# Patient Record
Sex: FEMALE | Race: WHITE | NOT HISPANIC OR LATINO | URBAN - METROPOLITAN AREA
[De-identification: names, ages, dates, MRNs, and addresses within clinical notes are randomized per-mention and may not be internally consistent; named-entity substitution may affect disease eponyms.]

---

## 2017-11-25 ENCOUNTER — EMERGENCY (EMERGENCY)
Facility: HOSPITAL | Age: 72
LOS: 1 days | Discharge: HOME | End: 2017-11-25
Admitting: EMERGENCY MEDICINE
Payer: MEDICARE

## 2017-11-25 VITALS
DIASTOLIC BLOOD PRESSURE: 77 MMHG | OXYGEN SATURATION: 97 % | RESPIRATION RATE: 18 BRPM | HEART RATE: 77 BPM | TEMPERATURE: 98 F | SYSTOLIC BLOOD PRESSURE: 144 MMHG

## 2017-11-25 DIAGNOSIS — S60.561A INSECT BITE (NONVENOMOUS) OF RIGHT HAND, INITIAL ENCOUNTER: ICD-10-CM

## 2017-11-25 DIAGNOSIS — Z79.899 OTHER LONG TERM (CURRENT) DRUG THERAPY: ICD-10-CM
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
## 2017-11-25 DIAGNOSIS — S80.861A INSECT BITE (NONVENOMOUS), RIGHT LOWER LEG, INITIAL ENCOUNTER: ICD-10-CM

## 2017-11-25 DIAGNOSIS — S00.86XA INSECT BITE (NONVENOMOUS) OF OTHER PART OF HEAD, INITIAL ENCOUNTER: ICD-10-CM

## 2017-11-25 DIAGNOSIS — I10 ESSENTIAL (PRIMARY) HYPERTENSION: ICD-10-CM

## 2017-11-25 DIAGNOSIS — S80.862A INSECT BITE (NONVENOMOUS), LEFT LOWER LEG, INITIAL ENCOUNTER: ICD-10-CM
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
## 2017-11-25 DIAGNOSIS — E78.5 HYPERLIPIDEMIA, UNSPECIFIED: ICD-10-CM

## 2017-11-25 DIAGNOSIS — S60.562A INSECT BITE (NONVENOMOUS) OF LEFT HAND, INITIAL ENCOUNTER: ICD-10-CM
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
## 2017-11-25 PROCEDURE — 99283 EMERGENCY DEPT VISIT LOW MDM: CPT

## 2017-11-25 RX ORDER — HYDROXYZINE HCL 10 MG
50 TABLET ORAL ONCE
Qty: 0 | Refills: 0 | Status: COMPLETED | OUTPATIENT
Start: 2017-11-25 | End: 2017-11-25

## 2017-11-25 RX ORDER — HYDROCORTISONE 1 %
1 OINTMENT (GRAM) TOPICAL ONCE
Qty: 0 | Refills: 0 | Status: COMPLETED | OUTPATIENT
Start: 2017-11-25 | End: 2017-11-25

## 2017-11-25 RX ADMIN — Medication 50 MILLIGRAM(S): at 16:17

## 2017-11-25 RX ADMIN — Medication 50 MILLIGRAM(S): at 15:48

## 2017-11-25 RX ADMIN — Medication 1 APPLICATION(S): at 15:48

## 2017-11-25 NOTE — ED PROVIDER NOTE - MEDICAL DECISION MAKING DETAILS
Pt with likely bed bug bites diffusely. Will give Atarax and hydrocortisone cream, and dose of 50mg prednisone. Pt will follow up with Derm on Monday.

## 2017-11-25 NOTE — ED PROVIDER NOTE - PHYSICAL EXAMINATION
VITAL SIGNS: I have reviewed nursing notes and confirm.  CONSTITUTIONAL: Well-developed; well-nourished; in no acute distress.  SKIN: Large bites with surrounding erythema and edema to all 4 extremities and front of trunk and left side of cheek and jaw. No insects visible on skin.  HEAD: Normocephalic; atraumatic.  EYES: PERRL, EOM intact; conjunctiva and sclera clear.  ENT: No nasal discharge; airway clear.  NECK: Supple; non tender.  CARD: S1, S2 normal; no murmurs, gallops, or rubs. Regular rate and rhythm.  RESP: No wheezes, rales or rhonchi.  ABD: Normal bowel sounds; soft; non-distended; non-tender; no hepatosplenomegaly.  EXT: Normal ROM. No clubbing, cyanosis or edema.  NEURO: Alert, oriented. Grossly unremarkable.  PSYCH: Cooperative, appropriate.

## 2017-11-25 NOTE — ED PROVIDER NOTE - OBJECTIVE STATEMENT
73 yo F with Hx of HTN presents to ER c/o bug bites to entire body. Pt states stayed at hotel on Thursday and Friday and woke up this morning with bug bites, which are believed to be bed bugs and is concerned due to size of bites. Denies Hx of bites before.

## 2017-11-25 NOTE — ED ADULT TRIAGE NOTE - CHIEF COMPLAINT QUOTE
Pt states from staying in a hotel last night she was bit by bed bugs and now they are itchy. Redness noted to neck. No distress noted.

## 2022-10-15 ENCOUNTER — EMERGENCY (EMERGENCY)
Facility: HOSPITAL | Age: 77
LOS: 1 days | Discharge: HOME | End: 2022-10-15
Attending: EMERGENCY MEDICINE | Admitting: EMERGENCY MEDICINE

## 2022-10-15 VITALS
SYSTOLIC BLOOD PRESSURE: 131 MMHG | HEIGHT: 63 IN | DIASTOLIC BLOOD PRESSURE: 90 MMHG | RESPIRATION RATE: 16 BRPM | HEART RATE: 96 BPM | WEIGHT: 175.05 LBS | TEMPERATURE: 98 F | OXYGEN SATURATION: 99 %

## 2022-10-15 PROCEDURE — 99283 EMERGENCY DEPT VISIT LOW MDM: CPT

## 2022-10-15 RX ORDER — FUROSEMIDE 40 MG
0 TABLET ORAL
Qty: 0 | Refills: 0 | DISCHARGE

## 2022-10-15 RX ADMIN — Medication 1 TABLET(S): at 19:11

## 2022-10-15 NOTE — ED PROVIDER NOTE - NSFOLLOWUPINSTRUCTIONS_ED_ALL_ED_FT
Nonallergic Rhinitis      Nonallergic rhinitis is inflammation of the mucous membrane inside the nose. The mucous membrane is the tissue that produces mucus. This condition is different from having allergic rhinitis, which is an allergy that affects the nose. Allergic rhinitis occurs when the body's defense system, or immune system, reacts to a substance that a person is allergic to (allergen), such as pollen, pet dander, mold, or dust. Nonallergic rhinitis has many similar symptoms, but it is not caused by allergens.    Nonallergic rhinitis can be an acute or chronic problem. This means it can be short-term or long-term.      What are the causes?    This condition may be caused by many different things. Some common types of nonallergic rhinitis include:  •Infectious rhinitis. This is usually caused by an infection in the nose, throat, or upper airways (upper respiratory system).      •Vasomotor rhinitis. This is the most common type of chronic nonallergic rhinitis. It is caused by too much blood flow through your nose, and it leads to swelling in your nose. It is triggered by strong odors, cold air, stress, drinking alcohol, cigarette smoke, or changes in the weather.      •Occupational rhinitis. This type is caused by triggers in the workplace, such as chemicals, dust, animal dander, or air pollution.      •Hormonal rhinitis, in teenage girls and women. This type is caused by an increase in the hormone estrogen and may happen during pregnancy, puberty, or monthly menstrual periods. Hormonal rhinitis gives you fewer symptoms when estrogen levels drop.      •Drug-induced rhinitis. Several types of medicines can cause this, such as medicines for high blood pressure or heart disease, aspirin, or NSAIDs.      •Nonallergic rhinitis with eosinophilia syndrome (NARES). This type is caused by having too much eosinophil, a type of white blood cell.      Other causes include a reaction to eating hot or spicy foods. This does not usually cause long-term symptoms.    In some cases, the cause of nonallergic rhinitis is not known.      What increases the risk?    You are more likely to develop this condition if:  •You are 30–60 years of age.      •You are a woman. Women are twice as likely to have this condition.        What are the signs or symptoms?    Common symptoms of this condition include:  •Stuffy nose (nasal congestion).      •Runny nose.      •A feeling of mucus dripping down the back of your throat (postnasal drip).      •Trouble sleeping.      •Tiredness, or fatigue.      Other symptoms include:  •Sneezing.      •Coughing.      •Itchy nose.      •Bloodshot eyes.        How is this diagnosed?    This type may be diagnosed based on:  •Your symptoms and medical history.      •A physical exam.      •Allergy testing to rule out allergic rhinitis. You may have skin tests or blood tests.      Your health care provider may also take a swab of nasal discharge to look for an increased number of eosinophils. This would be done to confirm a diagnosis of NARES.      How is this treated?     Treatment for this condition depends on the cause. No single treatment works for everyone. Work with your health care provider to find the best treatment for you. Treatment may include:  •Avoiding the things that trigger your symptoms.    •Medicines to relieve congestion, such as:  •Steroid nasal spray. There are many types. You may need to try a few to find out which one works best.      •Decongestant medicine. This treats nasal congestion and may be given by mouth or as a nasal spray. These medicines are used only for a short time.        •Medicines to relieve a runny nose. These may include antihistamine medicines or anticholinergic nasal sprays.      •Nasal irrigation. This involves using a salt–water (saline) spray or saline container called a neti pot. Nasal irrigation helps to clear away mucus and keep your nasal passages moist.      •Surgery to remove part of your mucous membrane. This is done in severe cases if the condition has not improved after 6–12 months of treatment.        Follow these instructions at home:    Medicines     •Take or use over-the-counter and prescription medicines only as told by your health care provider. Do not stop using your medicine even if you start to feel better.      • Do not take NSAIDs, such as ibuprofen, or medicines that contain aspirin if they make your symptoms worse.      Lifestyle     • Do not drink alcohol if it makes your symptoms worse.      • Do not use any products that contain nicotine or tobacco, such as cigarettes, e-cigarettes, and chewing tobacco. If you need help quitting, ask your health care provider.      •Avoid secondhand smoke.      General instructions     •Avoid triggers that make your symptoms worse.      •Use nasal irrigation as told by your health care provider.      •Get exercise. Exercise may help reduce symptoms for some people.      •Sleep with the head of your bed raised. This may reduce nasal congestion when you sleep.      •Drink enough fluid to keep your urine pale yellow.      •Keep all follow-up visits as told by your health care provider. This is important.        Contact a health care provider if:    •You have a fever.      •Your symptoms are getting worse at home.      •Your symptoms do not lessen with medicine.      •You develop new symptoms, especially a headache or nosebleed.        Summary    •Nonallergic rhinitis is inflammation inside the nose that is not caused by allergens. Nonallergic rhinitis can be a short-term or long-term problem.      •Treatment may include avoiding the things that trigger your symptoms.      •Take or use over-the-counter and prescription medicines only as told by your health care provider. Do not stop using your medicine even if you start to feel better.      •Contact a health care provider if your symptoms do not lessen with medicine.      This information is not intended to replace advice given to you by your health care provider. Make sure you discuss any questions you have with your health care provider.      Document Revised: 10/26/2020 Document Reviewed: 10/26/2020    Elsevier Patient Education © 2022 [employer]

## 2022-10-15 NOTE — ED PROVIDER NOTE - CLINICAL SUMMARY MEDICAL DECISION MAKING FREE TEXT BOX
Patient with pain, erythema, and hyperemia of right nasal mucosa. will tx with antibiotics and refer to ENT. Return to the ED immediately if getting worse, not improving, or if having any new or troubling symptoms.

## 2022-10-15 NOTE — ED PROVIDER NOTE - PHYSICAL EXAMINATION
Gen: Well-developed, well-nourished, NAD, VS as noted by nursing. HEENT: NCAT, mmm, erythema of b/l nasal mucosa, markedly worse on right with hyperemia. Clear nasal d/c.  Chest: RRR, nl S1 and S2, no m/r/g. Resp: CTAB, no w/r/r  Abd: nl BS, soft, nt/nd. Ext: Warm, dry

## 2022-10-15 NOTE — ED PROVIDER NOTE - OBJECTIVE STATEMENT
Patient c/o 3 days of severe pain inside right naris. Hurts when she breaths through her nose. Has been controlling the pain by plugging naris with cotton covered in Aquafor. When nose is plugged, she has no pain. Has chronic rhinorrhea for 1 year. Uses a CPAP, but has not used it since pain began. No fever, cough, epistaxis, sore throat, cp, sob.

## 2022-10-15 NOTE — ED PROVIDER NOTE - NSFOLLOWUPCLINICS_GEN_ALL_ED_FT
[facility]  Otolaryngology (ENT)  [address] [place] [address]  Phone: [phone]  Fax: [phone]  Follow Up Time: 4-6 Days

## 2022-10-15 NOTE — ED PROVIDER NOTE - PATIENT PORTAL LINK FT
You can access the FollowMyHealth Patient Portal offered by [facility] by registering at the following website: [email or web address]. By joining [facility]’s FollowMyHealth portal, you will also be able to view your health information using other applications (apps) compatible with our system.

## 2022-10-19 DIAGNOSIS — J34.89 OTHER SPECIFIED DISORDERS OF NOSE AND NASAL SINUSES: ICD-10-CM

## 2022-10-19 DIAGNOSIS — E78.5 HYPERLIPIDEMIA, UNSPECIFIED: ICD-10-CM

## 2022-10-19 DIAGNOSIS — I10 ESSENTIAL (PRIMARY) HYPERTENSION: ICD-10-CM

## 2025-05-01 ENCOUNTER — INPATIENT (INPATIENT)
Facility: HOSPITAL | Age: 80
LOS: 4 days | Discharge: HOME HEALTH | End: 2025-05-06
Attending: STUDENT IN AN ORGANIZED HEALTH CARE EDUCATION/TRAINING PROGRAM | Admitting: INTERNAL MEDICINE
Payer: MEDICARE

## 2025-05-01 VITALS
RESPIRATION RATE: 15 BRPM | OXYGEN SATURATION: 98 % | HEIGHT: 66 IN | DIASTOLIC BLOOD PRESSURE: 67 MMHG | HEART RATE: 79 BPM | SYSTOLIC BLOOD PRESSURE: 107 MMHG | WEIGHT: 231.49 LBS | TEMPERATURE: 98 F

## 2025-05-01 LAB
ANION GAP SERPL CALC-SCNC: 10 MMOL/L — SIGNIFICANT CHANGE UP (ref 9–16)
BASOPHILS # BLD AUTO: 0.02 K/UL — SIGNIFICANT CHANGE UP (ref 0–0.2)
BASOPHILS NFR BLD AUTO: 0.3 % — SIGNIFICANT CHANGE UP (ref 0–2)
BUN SERPL-MCNC: 28 MG/DL — HIGH (ref 7–23)
CALCIUM SERPL-MCNC: 9.1 MG/DL — SIGNIFICANT CHANGE UP (ref 8.5–10.5)
CHLORIDE SERPL-SCNC: 90 MMOL/L — LOW (ref 96–108)
CO2 SERPL-SCNC: 30 MMOL/L — SIGNIFICANT CHANGE UP (ref 22–31)
CREAT SERPL-MCNC: 1.36 MG/DL — HIGH (ref 0.5–1.3)
EGFR: 39 ML/MIN/1.73M2 — LOW
EGFR: 39 ML/MIN/1.73M2 — LOW
EOSINOPHIL # BLD AUTO: 0.05 K/UL — SIGNIFICANT CHANGE UP (ref 0–0.5)
EOSINOPHIL NFR BLD AUTO: 0.6 % — SIGNIFICANT CHANGE UP (ref 0–6)
GLUCOSE SERPL-MCNC: 343 MG/DL — HIGH (ref 70–99)
HCT VFR BLD CALC: 38.2 % — SIGNIFICANT CHANGE UP (ref 34.5–45)
HGB BLD-MCNC: 13.3 G/DL — SIGNIFICANT CHANGE UP (ref 11.5–15.5)
IMM GRANULOCYTES # BLD AUTO: 0.33 K/UL — HIGH (ref 0–0.07)
IMM GRANULOCYTES NFR BLD AUTO: 4.1 % — HIGH (ref 0–0.9)
IMMATURE PLATELET FRACTION #: 5 K/UL — SIGNIFICANT CHANGE UP (ref 4.7–11.1)
IMMATURE PLATELET FRACTION %: 8.2 % — HIGH (ref 1.6–4.9)
LYMPHOCYTES # BLD AUTO: 1.6 K/UL — SIGNIFICANT CHANGE UP (ref 1–3.3)
LYMPHOCYTES NFR BLD AUTO: 20 % — SIGNIFICANT CHANGE UP (ref 13–44)
MCHC RBC-ENTMCNC: 34.8 G/DL — SIGNIFICANT CHANGE UP (ref 32–36)
MCHC RBC-ENTMCNC: 35.2 PG — HIGH (ref 27–34)
MCV RBC AUTO: 101.1 FL — HIGH (ref 80–100)
MONOCYTES # BLD AUTO: 0.42 K/UL — SIGNIFICANT CHANGE UP (ref 0–0.9)
MONOCYTES NFR BLD AUTO: 5.3 % — SIGNIFICANT CHANGE UP (ref 2–14)
NEUTROPHILS # BLD AUTO: 5.57 K/UL — SIGNIFICANT CHANGE UP (ref 1.8–7.4)
NEUTROPHILS NFR BLD AUTO: 69.7 % — SIGNIFICANT CHANGE UP (ref 43–77)
NRBC # BLD AUTO: 0.02 K/UL — HIGH (ref 0–0)
NRBC # FLD: 0.02 K/UL — HIGH (ref 0–0)
NRBC BLD AUTO-RTO: 0 /100 WBCS — SIGNIFICANT CHANGE UP (ref 0–0)
PCO2 BLDV: 44 MMHG — HIGH (ref 39–42)
PH BLDV: 7.49 — HIGH (ref 7.32–7.43)
PLATELET # BLD AUTO: 61 K/UL — LOW (ref 150–400)
PMV BLD: 12.3 FL — SIGNIFICANT CHANGE UP (ref 7–13)
PO2 BLDV: 55 MMHG — HIGH (ref 25–45)
POTASSIUM SERPL-MCNC: 5.3 MMOL/L — SIGNIFICANT CHANGE UP (ref 3.5–5.3)
POTASSIUM SERPL-SCNC: 5.3 MMOL/L — SIGNIFICANT CHANGE UP (ref 3.5–5.3)
RBC # BLD: 3.78 M/UL — LOW (ref 3.8–5.2)
RBC # FLD: 12.4 % — SIGNIFICANT CHANGE UP (ref 10.3–14.5)
SAO2 % BLDV: 88.1 % — HIGH (ref 67–88)
SODIUM SERPL-SCNC: 130 MMOL/L — LOW (ref 132–145)
WBC # BLD: 8.01 K/UL — SIGNIFICANT CHANGE UP (ref 3.8–10.5)
WBC # FLD AUTO: 8.01 K/UL — SIGNIFICANT CHANGE UP (ref 3.8–10.5)

## 2025-05-01 PROCEDURE — 99285 EMERGENCY DEPT VISIT HI MDM: CPT

## 2025-05-01 RX ORDER — APIXABAN 2.5 MG/1
5 TABLET, FILM COATED ORAL ONCE
Refills: 0 | Status: COMPLETED | OUTPATIENT
Start: 2025-05-01 | End: 2025-05-01

## 2025-05-01 RX ORDER — APIXABAN 2.5 MG/1
TABLET, FILM COATED ORAL
Refills: 0 | Status: DISCONTINUED | OUTPATIENT
Start: 2025-05-01 | End: 2025-05-01

## 2025-05-01 RX ORDER — PREDNISONE 20 MG/1
2 TABLET ORAL ONCE
Refills: 0 | Status: DISCONTINUED | OUTPATIENT
Start: 2025-05-01 | End: 2025-05-01

## 2025-05-01 RX ORDER — PREDNISONE 20 MG/1
20 TABLET ORAL ONCE
Refills: 0 | Status: COMPLETED | OUTPATIENT
Start: 2025-05-01 | End: 2025-05-01

## 2025-05-01 RX ADMIN — Medication 1000 MILLILITER(S): at 21:47

## 2025-05-01 RX ADMIN — PREDNISONE 20 MILLIGRAM(S): 20 TABLET ORAL at 21:47

## 2025-05-01 RX ADMIN — APIXABAN 5 MILLIGRAM(S): 2.5 TABLET, FILM COATED ORAL at 21:46

## 2025-05-01 NOTE — ED ADULT TRIAGE NOTE - CHIEF COMPLAINT QUOTE
pt here with unsteady gait and feeling "wobbly, lightheaded, dizzy since yesterday after her biopsy procedure"; had a liquid diet in prep for biopsy;  with EMS; felt lightheaded again at 5pm and fell into recliner chair; denies falls; was with PT at the time of incident and had finished therapy; BEFAST negative

## 2025-05-01 NOTE — ED ADULT NURSE NOTE - OBJECTIVE STATEMENT
Pt presents to ed stating dizziness, high blood sugar. Pt had recent head injury with "normal" CTs. Pt is aox4. skin pwd. ecchymotic and swollen R eye. ambulatory with cane at baseline. denies new head injury.

## 2025-05-01 NOTE — ED PROVIDER NOTE - OBJECTIVE STATEMENT
80-year-old female past medical history notable for hypertension, hypercholesterolemia, atrial fibrillation on apixaban now presents for hyperglycemia.  Of note, patient with recent fall after diazepam 5 days prior.  Patient sustains right orbital hematoma.  She underwent ED evaluation and states that she had negative head imaging at that time.  Patient states that today she laureen from a seated position felt lightheaded was caught before falling.  No trauma today.  Patient reports calling EMS.  On EMS arrival fingerstick glucose was 485.  Patient does not carry diagnosis of diabetes.  Patient is not treated for diabetes.  Patient reports some dizziness but otherwise feels well.  Patient denies headache, chest pain, abdominal pain.  Patient denies numbness, paresthesias, weakness of any given extremity.  Patient denies trouble forming thoughts, speaking. 80-year-old female past medical history notable for hypertension, hypercholesterolemia, atrial fibrillation on apixaban now presents for hyperglycemia.  Of note, patient with recent fall after diazepam 5 days prior.  Patient sustains right orbital hematoma.  She underwent ED evaluation and states that she had negative head imaging at that time.  Patient states that today she laureen from a seated position felt lightheaded was caught before falling.  No trauma today.  Patient reports calling EMS.  On EMS arrival fingerstick glucose was 485.  Patient does not carry diagnosis of diabetes.  Patient is not treated for diabetes.  Patient reports some dizziness but otherwise feels well.  Patient denies headache, chest pain, abdominal pain.  Patient denies numbness, paresthesias, weakness of any given extremity.  Patient denies trouble forming thoughts, speaking.    Of note, pt's cardiologist has noted her rising glucose levels but has deemed intervention to be excessive to date.

## 2025-05-01 NOTE — ED ADULT NURSE NOTE - NSFALLHARMRISKINTERV_ED_ALL_ED

## 2025-05-01 NOTE — ED PROVIDER NOTE - PHYSICAL EXAMINATION
VITAL SIGNS: I have reviewed nursing notes and confirm.  CONST: Well-developed; deconditioned habitus; No apparent distress.  Head: R periorbital hematoma; EOMI  ENT: No nasal discharge; airway clear.  EYES: Sclera clear. Pupils round and symmetrical bilaterally.  RESP: Breathing comfortably; speaking in full sentences.   MSK: No acute deformities noted to extremities. No tenderness to cervical/thoracic/lumbar spine to palpation.  NEURO: Alert, oriented. Speech is fluent and appropriate. Moving all extremities appropriately. No gross motor or sensory abnormalities.  SKIN: Skin is normal temperature; no diaphoresis; no pallor.  PSYCH: Cooperative. Appropriate mood, language, and behavior.

## 2025-05-01 NOTE — ED PROVIDER NOTE - CLINICAL SUMMARY MEDICAL DECISION MAKING FREE TEXT BOX
80-year-old female past medical history notable for hypertension, hypercholesterolemia, atrial fibrillation on apixaban now presents for hyperglycemia. Exam as above. Concern for diabetes. Will obtain labs to rule out DKA. Dispo pending response to therapy.

## 2025-05-02 DIAGNOSIS — K21.9 GASTRO-ESOPHAGEAL REFLUX DISEASE WITHOUT ESOPHAGITIS: ICD-10-CM

## 2025-05-02 DIAGNOSIS — I63.9 CEREBRAL INFARCTION, UNSPECIFIED: ICD-10-CM

## 2025-05-02 DIAGNOSIS — E87.1 HYPO-OSMOLALITY AND HYPONATREMIA: ICD-10-CM

## 2025-05-02 DIAGNOSIS — R79.89 OTHER SPECIFIED ABNORMAL FINDINGS OF BLOOD CHEMISTRY: ICD-10-CM

## 2025-05-02 DIAGNOSIS — E11.9 TYPE 2 DIABETES MELLITUS WITHOUT COMPLICATIONS: ICD-10-CM

## 2025-05-02 DIAGNOSIS — E78.5 HYPERLIPIDEMIA, UNSPECIFIED: ICD-10-CM

## 2025-05-02 DIAGNOSIS — Z29.9 ENCOUNTER FOR PROPHYLACTIC MEASURES, UNSPECIFIED: ICD-10-CM

## 2025-05-02 DIAGNOSIS — R53.1 WEAKNESS: ICD-10-CM

## 2025-05-02 DIAGNOSIS — D69.6 THROMBOCYTOPENIA, UNSPECIFIED: ICD-10-CM

## 2025-05-02 DIAGNOSIS — D75.89 OTHER SPECIFIED DISEASES OF BLOOD AND BLOOD-FORMING ORGANS: ICD-10-CM

## 2025-05-02 DIAGNOSIS — G47.33 OBSTRUCTIVE SLEEP APNEA (ADULT) (PEDIATRIC): ICD-10-CM
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
## 2025-05-02 DIAGNOSIS — J38.7 OTHER DISEASES OF LARYNX: ICD-10-CM

## 2025-05-02 DIAGNOSIS — M19.90 UNSPECIFIED OSTEOARTHRITIS, UNSPECIFIED SITE: ICD-10-CM

## 2025-05-02 DIAGNOSIS — R73.9 HYPERGLYCEMIA, UNSPECIFIED: ICD-10-CM

## 2025-05-02 DIAGNOSIS — I10 ESSENTIAL (PRIMARY) HYPERTENSION: ICD-10-CM

## 2025-05-02 DIAGNOSIS — N17.9 ACUTE KIDNEY FAILURE, UNSPECIFIED: ICD-10-CM

## 2025-05-02 DIAGNOSIS — I48.91 UNSPECIFIED ATRIAL FIBRILLATION: ICD-10-CM

## 2025-05-02 LAB
A1C WITH ESTIMATED AVERAGE GLUCOSE RESULT: 10.2 % — HIGH (ref 4–5.6)
ADD ON TEST-SPECIMEN IN LAB: SIGNIFICANT CHANGE UP
ANION GAP SERPL CALC-SCNC: 11 MMOL/L — SIGNIFICANT CHANGE UP (ref 5–17)
APPEARANCE UR: CLEAR — SIGNIFICANT CHANGE UP
BASOPHILS # BLD AUTO: 0 K/UL — SIGNIFICANT CHANGE UP (ref 0–0.2)
BASOPHILS NFR BLD AUTO: 0 % — SIGNIFICANT CHANGE UP (ref 0–2)
BILIRUB UR-MCNC: NEGATIVE — SIGNIFICANT CHANGE UP
BUN SERPL-MCNC: 23 MG/DL — SIGNIFICANT CHANGE UP (ref 7–23)
CALCIUM SERPL-MCNC: 8.7 MG/DL — SIGNIFICANT CHANGE UP (ref 8.4–10.5)
CHLORIDE SERPL-SCNC: 95 MMOL/L — LOW (ref 96–108)
CO2 SERPL-SCNC: 27 MMOL/L — SIGNIFICANT CHANGE UP (ref 22–31)
COLOR SPEC: YELLOW — SIGNIFICANT CHANGE UP
CREAT ?TM UR-MCNC: 60 MG/DL — SIGNIFICANT CHANGE UP
CREAT SERPL-MCNC: 1.16 MG/DL — SIGNIFICANT CHANGE UP (ref 0.5–1.3)
DIFF PNL FLD: NEGATIVE — SIGNIFICANT CHANGE UP
EGFR: 47 ML/MIN/1.73M2 — LOW
EGFR: 47 ML/MIN/1.73M2 — LOW
EOSINOPHIL # BLD AUTO: 0 K/UL — SIGNIFICANT CHANGE UP (ref 0–0.5)
EOSINOPHIL NFR BLD AUTO: 0 % — SIGNIFICANT CHANGE UP (ref 0–6)
ESTIMATED AVERAGE GLUCOSE: 246 MG/DL — HIGH (ref 68–114)
GLUCOSE SERPL-MCNC: 445 MG/DL — HIGH (ref 70–99)
GLUCOSE UR QL: >=1000 MG/DL
HCT VFR BLD CALC: 36.7 % — SIGNIFICANT CHANGE UP (ref 34.5–45)
HGB BLD-MCNC: 12.5 G/DL — SIGNIFICANT CHANGE UP (ref 11.5–15.5)
KETONES UR-MCNC: NEGATIVE MG/DL — SIGNIFICANT CHANGE UP
LEUKOCYTE ESTERASE UR-ACNC: NEGATIVE — SIGNIFICANT CHANGE UP
LYMPHOCYTES # BLD AUTO: 0.75 K/UL — LOW (ref 1–3.3)
LYMPHOCYTES # BLD AUTO: 9.9 % — LOW (ref 13–44)
MAGNESIUM SERPL-MCNC: 2.1 MG/DL — SIGNIFICANT CHANGE UP (ref 1.6–2.6)
MCHC RBC-ENTMCNC: 34.1 G/DL — SIGNIFICANT CHANGE UP (ref 32–36)
MCHC RBC-ENTMCNC: 35.4 PG — HIGH (ref 27–34)
MCV RBC AUTO: 104 FL — HIGH (ref 80–100)
MONOCYTES # BLD AUTO: 0.48 K/UL — SIGNIFICANT CHANGE UP (ref 0–0.9)
MONOCYTES NFR BLD AUTO: 6.3 % — SIGNIFICANT CHANGE UP (ref 2–14)
NEUTROPHILS # BLD AUTO: 6.22 K/UL — SIGNIFICANT CHANGE UP (ref 1.8–7.4)
NEUTROPHILS NFR BLD AUTO: 82 % — HIGH (ref 43–77)
NITRITE UR-MCNC: NEGATIVE — SIGNIFICANT CHANGE UP
OSMOLALITY UR: 523 MOSM/KG — SIGNIFICANT CHANGE UP (ref 300–900)
PH UR: 7.5 — SIGNIFICANT CHANGE UP (ref 5–8)
PHOSPHATE SERPL-MCNC: 3.2 MG/DL — SIGNIFICANT CHANGE UP (ref 2.5–4.5)
PLATELET # BLD AUTO: 55 K/UL — LOW (ref 150–400)
POTASSIUM SERPL-MCNC: 5.2 MMOL/L — SIGNIFICANT CHANGE UP (ref 3.5–5.3)
POTASSIUM SERPL-SCNC: 5.2 MMOL/L — SIGNIFICANT CHANGE UP (ref 3.5–5.3)
POTASSIUM UR-SCNC: 22 MMOL/L — SIGNIFICANT CHANGE UP
PROT ?TM UR-MCNC: 12 MG/DL — SIGNIFICANT CHANGE UP (ref 0–12)
PROT UR-MCNC: NEGATIVE MG/DL — SIGNIFICANT CHANGE UP
PROT/CREAT UR-RTO: 0.2 RATIO — SIGNIFICANT CHANGE UP (ref 0–0.2)
RBC # BLD: 3.53 M/UL — LOW (ref 3.8–5.2)
RBC # FLD: 12.3 % — SIGNIFICANT CHANGE UP (ref 10.3–14.5)
SODIUM SERPL-SCNC: 133 MMOL/L — LOW (ref 135–145)
SODIUM UR-SCNC: 80 MMOL/L — SIGNIFICANT CHANGE UP
SP GR SPEC: 1.02 — SIGNIFICANT CHANGE UP (ref 1–1.03)
UROBILINOGEN FLD QL: 1 MG/DL — SIGNIFICANT CHANGE UP (ref 0.2–1)
UUN UR-MCNC: 500 MG/DL — SIGNIFICANT CHANGE UP
WBC # BLD: 7.58 K/UL — SIGNIFICANT CHANGE UP (ref 3.8–10.5)
WBC # FLD AUTO: 7.58 K/UL — SIGNIFICANT CHANGE UP (ref 3.8–10.5)

## 2025-05-02 PROCEDURE — 99222 1ST HOSP IP/OBS MODERATE 55: CPT | Mod: GC

## 2025-05-02 PROCEDURE — 99223 1ST HOSP IP/OBS HIGH 75: CPT | Mod: GC

## 2025-05-02 PROCEDURE — 70450 CT HEAD/BRAIN W/O DYE: CPT | Mod: 26

## 2025-05-02 PROCEDURE — 70486 CT MAXILLOFACIAL W/O DYE: CPT | Mod: 26

## 2025-05-02 PROCEDURE — 72125 CT NECK SPINE W/O DYE: CPT | Mod: 26

## 2025-05-02 RX ORDER — PREDNISONE 20 MG/1
10 TABLET ORAL EVERY 24 HOURS
Refills: 0 | Status: DISCONTINUED | OUTPATIENT
Start: 2025-05-02 | End: 2025-05-02

## 2025-05-02 RX ORDER — TIOTROPIUM BROMIDE INHALATION SPRAY 3.12 UG/1
2 SPRAY, METERED RESPIRATORY (INHALATION) DAILY
Refills: 0 | Status: DISCONTINUED | OUTPATIENT
Start: 2025-05-02 | End: 2025-05-06

## 2025-05-02 RX ORDER — SODIUM CHLORIDE 9 G/1000ML
1000 INJECTION, SOLUTION INTRAVENOUS
Refills: 0 | Status: DISCONTINUED | OUTPATIENT
Start: 2025-05-02 | End: 2025-05-06

## 2025-05-02 RX ORDER — INSULIN LISPRO 100 U/ML
INJECTION, SOLUTION INTRAVENOUS; SUBCUTANEOUS
Refills: 0 | Status: DISCONTINUED | OUTPATIENT
Start: 2025-05-02 | End: 2025-05-06

## 2025-05-02 RX ORDER — APIXABAN 2.5 MG/1
5 TABLET, FILM COATED ORAL EVERY 12 HOURS
Refills: 0 | Status: DISCONTINUED | OUTPATIENT
Start: 2025-05-02 | End: 2025-05-02

## 2025-05-02 RX ORDER — INSULIN LISPRO 100 U/ML
6 INJECTION, SOLUTION INTRAVENOUS; SUBCUTANEOUS
Refills: 0 | Status: DISCONTINUED | OUTPATIENT
Start: 2025-05-02 | End: 2025-05-02

## 2025-05-02 RX ORDER — DEXTROSE 50 % IN WATER 50 %
12.5 SYRINGE (ML) INTRAVENOUS ONCE
Refills: 0 | Status: DISCONTINUED | OUTPATIENT
Start: 2025-05-02 | End: 2025-05-06

## 2025-05-02 RX ORDER — EZETIMIBE 10 MG/1
1 TABLET ORAL
Refills: 0 | DISCHARGE

## 2025-05-02 RX ORDER — INSULIN GLARGINE-YFGN 100 [IU]/ML
24 INJECTION, SOLUTION SUBCUTANEOUS AT BEDTIME
Refills: 0 | Status: DISCONTINUED | OUTPATIENT
Start: 2025-05-02 | End: 2025-05-06

## 2025-05-02 RX ORDER — SODIUM CHLORIDE 9 G/1000ML
500 INJECTION, SOLUTION INTRAVENOUS
Refills: 0 | Status: COMPLETED | OUTPATIENT
Start: 2025-05-02 | End: 2025-05-02

## 2025-05-02 RX ORDER — EZETIMIBE 10 MG/1
10 TABLET ORAL DAILY
Refills: 0 | Status: DISCONTINUED | OUTPATIENT
Start: 2025-05-02 | End: 2025-05-06

## 2025-05-02 RX ORDER — PREDNISONE 20 MG/1
5 TABLET ORAL EVERY 24 HOURS
Refills: 0 | Status: DISCONTINUED | OUTPATIENT
Start: 2025-05-04 | End: 2025-05-06

## 2025-05-02 RX ORDER — INSULIN LISPRO 100 U/ML
12 INJECTION, SOLUTION INTRAVENOUS; SUBCUTANEOUS ONCE
Refills: 0 | Status: COMPLETED | OUTPATIENT
Start: 2025-05-02 | End: 2025-05-02

## 2025-05-02 RX ORDER — IPRATROPIUM BROMIDE AND ALBUTEROL SULFATE .5; 2.5 MG/3ML; MG/3ML
3 SOLUTION RESPIRATORY (INHALATION) EVERY 6 HOURS
Refills: 0 | Status: DISCONTINUED | OUTPATIENT
Start: 2025-05-02 | End: 2025-05-06

## 2025-05-02 RX ORDER — INSULIN GLARGINE-YFGN 100 [IU]/ML
15 INJECTION, SOLUTION SUBCUTANEOUS AT BEDTIME
Refills: 0 | Status: DISCONTINUED | OUTPATIENT
Start: 2025-05-02 | End: 2025-05-02

## 2025-05-02 RX ORDER — DEXTROSE 50 % IN WATER 50 %
15 SYRINGE (ML) INTRAVENOUS ONCE
Refills: 0 | Status: DISCONTINUED | OUTPATIENT
Start: 2025-05-02 | End: 2025-05-06

## 2025-05-02 RX ORDER — ATORVASTATIN CALCIUM 80 MG/1
80 TABLET, FILM COATED ORAL AT BEDTIME
Refills: 0 | Status: DISCONTINUED | OUTPATIENT
Start: 2025-05-02 | End: 2025-05-06

## 2025-05-02 RX ORDER — INSULIN LISPRO 100 U/ML
8 INJECTION, SOLUTION INTRAVENOUS; SUBCUTANEOUS
Refills: 0 | Status: DISCONTINUED | OUTPATIENT
Start: 2025-05-02 | End: 2025-05-06

## 2025-05-02 RX ORDER — METOPROLOL SUCCINATE 50 MG/1
1 TABLET, EXTENDED RELEASE ORAL
Refills: 0 | DISCHARGE

## 2025-05-02 RX ORDER — DEXTROSE 50 % IN WATER 50 %
25 SYRINGE (ML) INTRAVENOUS ONCE
Refills: 0 | Status: DISCONTINUED | OUTPATIENT
Start: 2025-05-02 | End: 2025-05-06

## 2025-05-02 RX ORDER — METOPROLOL SUCCINATE 50 MG/1
200 TABLET, EXTENDED RELEASE ORAL DAILY
Refills: 0 | Status: DISCONTINUED | OUTPATIENT
Start: 2025-05-02 | End: 2025-05-06

## 2025-05-02 RX ORDER — APIXABAN 2.5 MG/1
5 TABLET, FILM COATED ORAL EVERY 12 HOURS
Refills: 0 | Status: DISCONTINUED | OUTPATIENT
Start: 2025-05-02 | End: 2025-05-06

## 2025-05-02 RX ORDER — MELATONIN 5 MG
5 TABLET ORAL AT BEDTIME
Refills: 0 | Status: DISCONTINUED | OUTPATIENT
Start: 2025-05-02 | End: 2025-05-06

## 2025-05-02 RX ORDER — SODIUM CHLORIDE 9 G/1000ML
500 INJECTION, SOLUTION INTRAVENOUS ONCE
Refills: 0 | Status: COMPLETED | OUTPATIENT
Start: 2025-05-02 | End: 2025-05-02

## 2025-05-02 RX ORDER — GLUCAGON 3 MG/1
1 POWDER NASAL ONCE
Refills: 0 | Status: DISCONTINUED | OUTPATIENT
Start: 2025-05-02 | End: 2025-05-06

## 2025-05-02 RX ORDER — UMECLIDINIUM BROMIDE AND VILANTEROL TRIFENATATE 62.5; 25 UG/1; UG/1
1 POWDER RESPIRATORY (INHALATION)
Refills: 0 | DISCHARGE

## 2025-05-02 RX ORDER — INSULIN LISPRO 100 U/ML
INJECTION, SOLUTION INTRAVENOUS; SUBCUTANEOUS
Refills: 0 | Status: DISCONTINUED | OUTPATIENT
Start: 2025-05-02 | End: 2025-05-02

## 2025-05-02 RX ORDER — APIXABAN 2.5 MG/1
1 TABLET, FILM COATED ORAL
Refills: 0 | DISCHARGE

## 2025-05-02 RX ORDER — ISOPROPYL ALCOHOL, BENZOCAINE .7; .06 ML/ML; ML/ML
0 SWAB TOPICAL
Qty: 100 | Refills: 1
Start: 2025-05-02

## 2025-05-02 RX ORDER — DIAZEPAM 2 MG/1
1 TABLET ORAL
Refills: 0 | DISCHARGE

## 2025-05-02 RX ORDER — PREDNISONE 20 MG/1
10 TABLET ORAL ONCE
Refills: 0 | Status: COMPLETED | OUTPATIENT
Start: 2025-05-03 | End: 2025-05-03

## 2025-05-02 RX ORDER — ACETAMINOPHEN 500 MG/5ML
650 LIQUID (ML) ORAL EVERY 6 HOURS
Refills: 0 | Status: DISCONTINUED | OUTPATIENT
Start: 2025-05-02 | End: 2025-05-06

## 2025-05-02 RX ADMIN — Medication 1 DOSE(S): at 06:16

## 2025-05-02 RX ADMIN — APIXABAN 5 MILLIGRAM(S): 2.5 TABLET, FILM COATED ORAL at 09:55

## 2025-05-02 RX ADMIN — INSULIN LISPRO 2: 100 INJECTION, SOLUTION INTRAVENOUS; SUBCUTANEOUS at 22:26

## 2025-05-02 RX ADMIN — PREDNISONE 10 MILLIGRAM(S): 20 TABLET ORAL at 17:22

## 2025-05-02 RX ADMIN — Medication 650 MILLIGRAM(S): at 05:41

## 2025-05-02 RX ADMIN — APIXABAN 5 MILLIGRAM(S): 2.5 TABLET, FILM COATED ORAL at 17:50

## 2025-05-02 RX ADMIN — ATORVASTATIN CALCIUM 80 MILLIGRAM(S): 80 TABLET, FILM COATED ORAL at 22:23

## 2025-05-02 RX ADMIN — INSULIN LISPRO 6: 100 INJECTION, SOLUTION INTRAVENOUS; SUBCUTANEOUS at 12:59

## 2025-05-02 RX ADMIN — SODIUM CHLORIDE 250 MILLILITER(S): 9 INJECTION, SOLUTION INTRAVENOUS at 15:58

## 2025-05-02 RX ADMIN — Medication 650 MILLIGRAM(S): at 04:41

## 2025-05-02 RX ADMIN — Medication 650 MILLIGRAM(S): at 21:03

## 2025-05-02 RX ADMIN — INSULIN LISPRO 6 UNIT(S): 100 INJECTION, SOLUTION INTRAVENOUS; SUBCUTANEOUS at 17:48

## 2025-05-02 RX ADMIN — Medication 1 DOSE(S): at 17:23

## 2025-05-02 RX ADMIN — METOPROLOL SUCCINATE 200 MILLIGRAM(S): 50 TABLET, EXTENDED RELEASE ORAL at 06:03

## 2025-05-02 RX ADMIN — EZETIMIBE 10 MILLIGRAM(S): 10 TABLET ORAL at 11:33

## 2025-05-02 RX ADMIN — Medication 40 MILLIGRAM(S): at 06:04

## 2025-05-02 RX ADMIN — Medication 5 MILLIGRAM(S): at 23:17

## 2025-05-02 RX ADMIN — INSULIN LISPRO 6 UNIT(S): 100 INJECTION, SOLUTION INTRAVENOUS; SUBCUTANEOUS at 12:59

## 2025-05-02 RX ADMIN — Medication 650 MILLIGRAM(S): at 22:03

## 2025-05-02 RX ADMIN — INSULIN LISPRO 12 UNIT(S): 100 INJECTION, SOLUTION INTRAVENOUS; SUBCUTANEOUS at 08:03

## 2025-05-02 RX ADMIN — INSULIN GLARGINE-YFGN 24 UNIT(S): 100 INJECTION, SOLUTION SUBCUTANEOUS at 22:23

## 2025-05-02 RX ADMIN — Medication 15 UNIT(S): at 09:51

## 2025-05-02 RX ADMIN — SODIUM CHLORIDE 500 MILLILITER(S): 9 INJECTION, SOLUTION INTRAVENOUS at 04:41

## 2025-05-02 RX ADMIN — TIOTROPIUM BROMIDE INHALATION SPRAY 2 PUFF(S): 3.12 SPRAY, METERED RESPIRATORY (INHALATION) at 11:33

## 2025-05-02 NOTE — PROGRESS NOTE ADULT - PROBLEM SELECTOR PLAN 11
Recently found outpatient. Patient states to have had sore throat which led to workup with nasal scope for visualization of her throat evidencing mass that was found. Biopsied yesterday. Follows with Dr. Ribeiro from [place].    Plan:   - Can attempt to obtain more collateral Patient takes omeprazole 40 mg qd at home.    Plan;   - C/w home medication

## 2025-05-02 NOTE — H&P ADULT - PROBLEM SELECTOR PLAN 11
Recently found outpatient. Patient states to have had sore throat which led to workup with nasal scope for visualization of her throat evidencing mass that was found. Biopsied yesterday. Follows with Dr. Ribeiro from [place].    Plan:   - Can attempt to obtain more collateral

## 2025-05-02 NOTE — ADVANCED PRACTICE NURSE CONSULT - RECOMMEDATIONS
Manuel called stating that he has a dentist appointment today at [facility] and he is wondering if he still needs dental antibiotics. He had his right knee replaced last November. He would like a call back.   Please consider [facility] meal service  [facility] questioned, of [place]- will this patient be discharged to home?    For endocrine service: as per pt request- is an oral medication regimen to be considered ?  See DSME for education.

## 2025-05-02 NOTE — PROGRESS NOTE ADULT - PROBLEM SELECTOR PLAN 12
F: S/p 1.5 L  E: Replete as needed  N: DASH  DVT proph: Eliquis 5 mg BID  GI Proph: Pantoprazole   Dispo: [facility] Recently found outpatient. Patient states to have had sore throat which led to workup with nasal scope for visualization of her throat evidencing mass that was found. Biopsied yesterday. Follows with Dr. Ribeiro from [place].    Plan:   - Can attempt to obtain more collateral        Prophylactic measure.   ·  Plan: F: S/p 1.5 L  E: Replete as needed  N: CCD  DVT proph: Eliquis 5 mg BID  GI Proph: Pantoprazole   Dispo: [facility].

## 2025-05-02 NOTE — PROGRESS NOTE ADULT - SUBJECTIVE AND OBJECTIVE BOX
Progress Note    SUBJECTIVE:   Patient seen and examined at bedside. Condition largely unchanged from yesterday. No acute complaints at this time.    ROS:  Negative unless otherwise stated above.    PHYSICAL EXAM:  General: Alert and oriented x 3. No acute distress.   HEENT: Moist mucous membranes. Anicteric. No cervical lymphadenopathy.  Cardiovascular: Regular rate and rhythm. No murmur. Normal JVP.  Lungs: Clear to auscultation bilaterally. No accessory muscle use.  Abdomen: Soft, non-tender and non-distended. No palpable masses.  Extremities: No edema. Non-tender. Warm.  Skin: No rashes or lesions.   Neurologic: No apparent focal neurological deficits. CN II-XII grossly intact, but not individually tested.  Psychiatric: Cooperative. Appropriate mood and affect.    VITAL SIGNS:  Vital Signs Last 24 Hrs  T(C): 36.6 (02 May 2025 08:49), Max: 36.8 (02 May 2025 05:56)  T(F): 97.9 (02 May 2025 08:49), Max: 98.3 (02 May 2025 05:56)  HR: 66 (02 May 2025 08:49) (66 - 103)  BP: 97/61 (02 May 2025 08:49) (97/61 - 124/85)  BP(mean): --  RR: 17 (02 May 2025 08:49) (15 - 18)  SpO2: 94% (02 May 2025 08:49) (92% - 98%)    Parameters below as of 02 May 2025 08:49  Patient On (Oxygen Delivery Method): room air      INPATIENT MEDICATIONS:   MEDICATIONS  (STANDING):  apixaban 5 milliGRAM(s) Oral every 12 hours  atorvastatin 80 milliGRAM(s) Oral at bedtime  dextrose 5%. 1000 milliLiter(s) (50 mL/Hr) IV Continuous <Continuous>  dextrose 5%. 1000 milliLiter(s) (100 mL/Hr) IV Continuous <Continuous>  dextrose 50% Injectable 25 Gram(s) IV Push once  dextrose 50% Injectable 12.5 Gram(s) IV Push once  dextrose 50% Injectable 25 Gram(s) IV Push once  ezetimibe 10 milliGRAM(s) Oral daily  fluticasone propionate/ salmeterol 100-50 MICROgram(s) Diskus 1 Dose(s) Inhalation two times a day  glucagon  Injectable 1 milliGRAM(s) IntraMuscular once  insulin glargine Injectable (LANTUS) 15 Unit(s) SubCutaneous at bedtime  insulin lispro (ADMELOG) corrective regimen sliding scale   SubCutaneous three times a day before meals  insulin lispro Injectable (ADMELOG) 6 Unit(s) SubCutaneous three times a day before meals  metoprolol succinate  milliGRAM(s) Oral daily  pantoprazole    Tablet 40 milliGRAM(s) Oral before breakfast  predniSONE   Tablet 10 milliGRAM(s) Oral every 24 hours  tiotropium 2.5 MICROgram(s) Inhaler 2 Puff(s) Inhalation daily    MEDICATIONS  (PRN):  acetaminophen     Tablet .. 650 milliGRAM(s) Oral every 6 hours PRN Temp greater or equal to 38C (100.4F), Mild Pain (1 - 3)  albuterol/ipratropium for Nebulization 3 milliLiter(s) Nebulizer every 6 hours PRN Shortness of Breath and/or Wheezing  dextrose Oral Gel 15 Gram(s) Oral once PRN Blood Glucose LESS THAN 70 milliGRAM(s)/deciliter    HOME MEDICATIONS  Anoro Ellipta 62.5 mcg-25 mcg/inh inhalation powder: 1 puff(s) inhaled once a day  DilTIAZem Hydrochloride  mg/24 hours oral capsule, extended release: 1 cap(s) orally once a day  Eliquis 5 mg oral tablet: 1 tab(s) orally 2 times a day  gabapentin 300 mg oral tablet: 2 tab(s) orally 2 times a day  Lipitor: 80 milligram(s) orally once a day  Toprol- mg oral tablet, extended release: 1 tab(s) orally once a day  torsemide 20 mg oral tablet: 1 tab(s) orally once a day  Valium 5 mg oral tablet: 1 tab(s) orally once a day (at bedtime) as needed for  insomnia  Zetia 10 mg oral tablet: 1 tab(s) orally once a day    ALLERGIES:  Allergies    No Known Allergies    Intolerances    LABS:                       12.5   7.58  )-----------( 55       ( 02 May 2025 07:35 )             36.7     05-02    133[L]  |  95[L]  |  23  ----------------------------<  445[H]  5.2   |  27  |  1.16    Ca    8.7      02 May 2025 07:35  Phos  3.2     05-02  Mg     2.1     05-02        Urinalysis Basic - ( 02 May 2025 07:35 )    Color: x / Appearance: x / SG: x / pH: x  Gluc: 445 mg/dL / Ketone: x  / Bili: x / Urobili: x   Blood: x / Protein: x / Nitrite: x   Leuk Esterase: x / RBC: x / WBC x   Sq Epi: x / Non Sq Epi: x / Bacteria: x      CAPILLARY BLOOD GLUCOSE      POCT Blood Glucose.: 274 mg/dL (02 May 2025 12:38)    RADIOLOGY & ADDITIONAL TESTS: Reviewed. Progress Note    SUBJECTIVE:   Patient seen and examined at bedside. States she feels fine right now. She hasn't been out of bed to see if she had this repeated episode of leg weakness.     ROS:  Negative unless otherwise stated above.    PHYSICAL EXAM:  General: Alert and oriented x 3.   HEENT: Moist mucous membranes. Purple ecchymosis over R eye. No other traumatic injuries identified on head.  Cardiovascular: Irregularly irregular rhythm. No murmur  Lungs: Expiratory wheezes bilaterally  Abdomen: Soft, non-tender and non-distended.    Extremities: No edema. Non-tender. Warm.  Skin: No rashes or lesions.   Neurologic: No apparent focal neurological deficits. CN II-XII grossly intact, but not individually tested.  Psychiatric: Cooperative. Appropriate mood and affect.    VITAL SIGNS:  Vital Signs Last 24 Hrs  T(C): 36.6 (02 May 2025 08:49), Max: 36.8 (02 May 2025 05:56)  T(F): 97.9 (02 May 2025 08:49), Max: 98.3 (02 May 2025 05:56)  HR: 66 (02 May 2025 08:49) (66 - 103)  BP: 97/61 (02 May 2025 08:49) (97/61 - 124/85)  BP(mean): --  RR: 17 (02 May 2025 08:49) (15 - 18)  SpO2: 94% (02 May 2025 08:49) (92% - 98%)    Parameters below as of 02 May 2025 08:49  Patient On (Oxygen Delivery Method): room air      INPATIENT MEDICATIONS:   MEDICATIONS  (STANDING):  apixaban 5 milliGRAM(s) Oral every 12 hours  atorvastatin 80 milliGRAM(s) Oral at bedtime  dextrose 5%. 1000 milliLiter(s) (50 mL/Hr) IV Continuous <Continuous>  dextrose 5%. 1000 milliLiter(s) (100 mL/Hr) IV Continuous <Continuous>  dextrose 50% Injectable 25 Gram(s) IV Push once  dextrose 50% Injectable 12.5 Gram(s) IV Push once  dextrose 50% Injectable 25 Gram(s) IV Push once  ezetimibe 10 milliGRAM(s) Oral daily  fluticasone propionate/ salmeterol 100-50 MICROgram(s) Diskus 1 Dose(s) Inhalation two times a day  glucagon  Injectable 1 milliGRAM(s) IntraMuscular once  insulin glargine Injectable (LANTUS) 15 Unit(s) SubCutaneous at bedtime  insulin lispro (ADMELOG) corrective regimen sliding scale   SubCutaneous three times a day before meals  insulin lispro Injectable (ADMELOG) 6 Unit(s) SubCutaneous three times a day before meals  metoprolol succinate  milliGRAM(s) Oral daily  pantoprazole    Tablet 40 milliGRAM(s) Oral before breakfast  predniSONE   Tablet 10 milliGRAM(s) Oral every 24 hours  tiotropium 2.5 MICROgram(s) Inhaler 2 Puff(s) Inhalation daily    MEDICATIONS  (PRN):  acetaminophen     Tablet .. 650 milliGRAM(s) Oral every 6 hours PRN Temp greater or equal to 38C (100.4F), Mild Pain (1 - 3)  albuterol/ipratropium for Nebulization 3 milliLiter(s) Nebulizer every 6 hours PRN Shortness of Breath and/or Wheezing  dextrose Oral Gel 15 Gram(s) Oral once PRN Blood Glucose LESS THAN 70 milliGRAM(s)/deciliter    HOME MEDICATIONS  Anoro Ellipta 62.5 mcg-25 mcg/inh inhalation powder: 1 puff(s) inhaled once a day  DilTIAZem Hydrochloride  mg/24 hours oral capsule, extended release: 1 cap(s) orally once a day  Eliquis 5 mg oral tablet: 1 tab(s) orally 2 times a day  gabapentin 300 mg oral tablet: 2 tab(s) orally 2 times a day  Lipitor: 80 milligram(s) orally once a day  Toprol- mg oral tablet, extended release: 1 tab(s) orally once a day  torsemide 20 mg oral tablet: 1 tab(s) orally once a day  Valium 5 mg oral tablet: 1 tab(s) orally once a day (at bedtime) as needed for  insomnia  Zetia 10 mg oral tablet: 1 tab(s) orally once a day    ALLERGIES:  Allergies    No Known Allergies    Intolerances    LABS:                       12.5   7.58  )-----------( 55       ( 02 May 2025 07:35 )             36.7     05-02    133[L]  |  95[L]  |  23  ----------------------------<  445[H]  5.2   |  27  |  1.16    Ca    8.7      02 May 2025 07:35  Phos  3.2     05-02  Mg     2.1     05-02        Urinalysis Basic - ( 02 May 2025 07:35 )    Color: x / Appearance: x / SG: x / pH: x  Gluc: 445 mg/dL / Ketone: x  / Bili: x / Urobili: x   Blood: x / Protein: x / Nitrite: x   Leuk Esterase: x / RBC: x / WBC x   Sq Epi: x / Non Sq Epi: x / Bacteria: x      CAPILLARY BLOOD GLUCOSE      POCT Blood Glucose.: 274 mg/dL (02 May 2025 12:38)    RADIOLOGY & ADDITIONAL TESTS: Reviewed.

## 2025-05-02 NOTE — CONSULT NOTE ADULT - TIME BILLING
Review of data, initiation of basal/bolus insulin, discussion with pt regarding plans, recommendations regarding steroid management

## 2025-05-02 NOTE — H&P ADULT - PROBLEM SELECTOR PLAN 12
F: S/p 1.5 L  E: Replete as needed  N: DASH  DVT proph: Eliquis 5 mg BID  GI Proph: Omeprazole equivalent  Dispo: [facility] F: S/p 1.5 L  E: Replete as needed  N: DASH  DVT proph: Eliquis 5 mg BID  GI Proph: Pantoprazole   Dispo: [facility]

## 2025-05-02 NOTE — H&P ADULT - PROBLEM SELECTOR PLAN 6
Patient with history of afib. Currently rate controlled, takes eliquis 5 mg BID and toprol 200 mg per surescripts.     Plan:   - C/w home medications  - Collateral from [place] [facility]ologist

## 2025-05-02 NOTE — PROGRESS NOTE ADULT - PROBLEM SELECTOR PLAN 7
Patient with history of COPD. Smoking since age 10, quit three weeks ago. On Trelegy at home.    Plan:   - C/w home medication    #SHEY  Patient compliant with bipap at home.     Plan:   - C/w bipap at night #Recurrent cardiac thrombus; s/p Watchman device  Currently rate controlled, takes eliquis 5 mg BID and toprol 200 mg qd.  Per OSH cardiologist at [facility], pt has had recurrent h/o cardiac thrombus despite LLA closure w/ Watchman whenever he is transitioned from eliquis to ASA (2/2 thrombocytopenia).   Last echo (1/2025): EF 55-60%. Diastolic fx unable to be determined 2/2 afib.     Plan:   - C/w home medications

## 2025-05-02 NOTE — H&P ADULT - NSHPREVIEWOFSYSTEMS_GEN_ALL_CORE
REVIEW OF SYSTEMS:  CONSTITUTIONAL: No weakness, fevers, or chills  EYES/ENT: No visual changes; No vertigo, throat pain, or dysphagia  NECK: No pain or stiffness  RESPIRATORY: No cough, wheezing, hemoptysis, or shortness of breath  CARDIOVASCULAR: No chest pain or palpitations  GASTROINTESTINAL: No abdominal or epigastric pain. No nausea, vomiting, or hematemesis; No diarrhea or constipation. No melena or hematochezia.  GENITOURINARY: No dysuria, frequency, or hematuria  MUSCULOSKELETAL: No joint or muscle pain or aches  NEUROLOGICAL: No numbness or weakness  SKIN: No itching or rashes REVIEW OF SYSTEMS:  CONSTITUTIONAL: No current weakness, fevers, or chills  EYES/ENT: No visual changes; No vertigo, throat pain, or dysphagia  NECK: No pain or stiffness  RESPIRATORY: No cough, wheezing, hemoptysis, or shortness of breath  CARDIOVASCULAR: No chest pain or palpitations  GASTROINTESTINAL: No abdominal or epigastric pain. No nausea, vomiting, or hematemesis; No diarrhea or constipation. No melena or hematochezia.  GENITOURINARY: No dysuria, frequency, or hematuria  MUSCULOSKELETAL: No joint or muscle pain or aches  NEUROLOGICAL: No numbness or weakness  SKIN: Bruising of her right eye after fall on Sunday

## 2025-05-02 NOTE — CONSULT NOTE ADULT - SUBJECTIVE AND OBJECTIVE BOX
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 Patient notes that earlier on 5/1 she was walking and notes that her legs gave out, unable to effectively describe the sensation experienced. She felt as if she was going to fall over however her friend's son, who lives with her, caught her from falling. She denies any sensation of dizziness, lightheaded, pre-syncopal episode, claims to have remembered all of the events. Had an episode similar to this early Monday morning where she had gotten up, went to her living room and then when she got back to her bed she experienced an episode of weakness as she describes it where her legs had also given out during that time, resulting in hitting her head on the floor where she bruised her right eye with continuing ecchymosis.  She claims it could have been secondarily to the valium she took prior to bed which she will occasionally take for insomnia. Denies any localizing infectious symptomatology such as sob, abdominal pain, nausea, vomiting, diarrhea, or dysuria, urinary frequency, foul smelling urine.     With regards to her pmhx she follow up with Pulmonology at [facility], Cardiology at [place], Surgery at [place]. Yesterday she obtained a biopsy for a recent finding of an esophageal/larynx mass that was found on w/u for persistent sore throat and during this procedure she was put under anesthesia.     In the Ed:  T: 98 HR: 79 BP: 107/67 RR: 15 @ 98% RA  Labs: MCV of 101.1, glucose of 342 with no AG, chlroide of 90, bicarb of 30, creatinine of 1.36 w/ egfr of 39, bun of 28, potassium of 5.3, sodium of 130  Images: None  Chest xray: None  EKG: AFib with rate control  Interventions: Eliquis 5 mg qd, prednisone 20 mg, NS 1L      Endocrinology has been consulted for Diabetes Management.      DIABETES HISTORY  - Age at diagnosis:   - Diabetes managed outpatient by:  - Current Therapy: jardiance 10mg   - History of other regimens:   - Home glucose readings:  - History of DKA/HHS:   - Diabetic Complications:   - Diet:        FAMILY HISTORY  - Diabetes:    SOCIAL HISTORY  - Work:  - Alcohol:  - Smoking:      CURRENT MEDICATIONS  acetaminophen     Tablet .. 650 milliGRAM(s) Oral every 6 hours PRN  albuterol/ipratropium for Nebulization 3 milliLiter(s) Nebulizer every 6 hours PRN  apixaban 5 milliGRAM(s) Oral every 12 hours  atorvastatin 80 milliGRAM(s) Oral at bedtime  dextrose 5%. 1000 milliLiter(s) IV Continuous <Continuous>  dextrose 5%. 1000 milliLiter(s) IV Continuous <Continuous>  dextrose 50% Injectable 25 Gram(s) IV Push once  dextrose 50% Injectable 12.5 Gram(s) IV Push once  dextrose 50% Injectable 25 Gram(s) IV Push once  dextrose Oral Gel 15 Gram(s) Oral once PRN  ezetimibe 10 milliGRAM(s) Oral daily  fluticasone propionate/ salmeterol 100-50 MICROgram(s) Diskus 1 Dose(s) Inhalation two times a day  glucagon  Injectable 1 milliGRAM(s) IntraMuscular once  insulin glargine Injectable (LANTUS) 15 Unit(s) SubCutaneous at bedtime  insulin lispro (ADMELOG) corrective regimen sliding scale   SubCutaneous three times a day before meals  insulin lispro Injectable (ADMELOG) 6 Unit(s) SubCutaneous three times a day before meals  metoprolol succinate  milliGRAM(s) Oral daily  pantoprazole    Tablet 40 milliGRAM(s) Oral before breakfast  predniSONE   Tablet 10 milliGRAM(s) Oral every 24 hours  tiotropium 2.5 MICROgram(s) Inhaler 2 Puff(s) Inhalation daily      REVIEW OF SYSTEMS  Constitutional:  Negative fever, chills   Cardiovascular:  Negative for chest pain or palpitations.  Respiratory:  Negative for cough, wheezing, or shortness of breath.   Gastrointestinal:  Negative for nausea, vomiting, diarrhea, constipation, or abdominal pain.  Genitourinary:  Negative frequency, urgency or dysuria.  Neurologic:  No headache, confusion, dizziness    PHYSICAL EXAM  Vital Signs Last 24 Hrs  T(C): 36.6 (02 May 2025 08:49), Max: 36.8 (02 May 2025 05:56)  T(F): 97.9 (02 May 2025 08:49), Max: 98.3 (02 May 2025 05:56)  HR: 66 (02 May 2025 08:49) (66 - 103)  BP: 97/61 (02 May 2025 08:49) (97/61 - 124/85)  BP(mean): --  RR: 17 (02 May 2025 08:49) (15 - 18)  SpO2: 94% (02 May 2025 08:49) (92% - 98%)    Parameters below as of 02 May 2025 08:49  Patient On (Oxygen Delivery Method): room air      Constitutional: Awake, alert  HEENT: Normocephalic, atraumatic, DAVE  Neck: supple, no acanthosis, no thyromegaly or palpable thyroid nodules.  Respiratory: Lungs clear to ausculation bilaterally.   Cardiovascular: regular rate and rhythm  GI: soft, non-tender, non-distended, bowel sounds present  Extremities: No lower extremity edema, peripheral pulses present.     LABS  CBC - WBC/HGB/HTC/PLT: 7.58/12.5/36.7/55 (05-02-25)  BMP: Na/K/Cl/Bicarb/BUN/Cr/Gluc: 133/5.2/95/27/23/1.16/445 (05-02-25)  Anion Gap: 11 (05-02-25)  eGFR: 47 (05-02-25)  Calcium: 8.7 (05-02-25)  Phosphorus: 3.2 (05-02-25)  Magnesium: 2.1 (05-02-25)      Thyroid Stimulating Hormone, Serum: 0.249 (05-02-25)  Total T4/Free T4: --/1.180 (05-02-25)  CBC - WBC/HGB/HTC/PLT: 7.58/12.5/36.7/55 (05-02-25)BMP: Na/K/Cl/Bicarb/BUN/Cr/Gluc: 133/5.2/95/27/23/1.16/445 (05-02-25)  Anion Gap: 11 (05-02-25)  eGFR: 47 (05-02-25)  Calcium: 8.7 (05-02-25)  Phosphorus: 3.2 (05-02-25)  Magnesium: 2.1 (05-02-25)    CAPILLARY BLOOD GLUCOSE & INSULIN RECEIVED  342 mg/dL (05-01 @ 19:48)  332 mg/dL (05-01 @ 21:22)  314 mg/dL (05-01 @ 22:54)  444 mg/dL (05-02 @ 07:27)  413 mg/dL (05-02 @ 09:09)  274 mg/dL (05-02 @ 12:38)          ASSESSMENT / RECOMMENDATIONS    A1C: 10.2 %  Creatinine: 1.16 GFR: 47  Weight: 105 BMI: 37.4  EF:     # Type 2 diabetes mellitus with hyperglycemia  - Please keep lantus   units at bedtime.   - Keep lispro   units before each meal.  - Continue lispro moderate dose sliding scale before meals and at bedtime.  - Patient's fingerstick glucose goal is 100-180 mg/dL.    - Discharge recommendations to be discussed.   - Patient can follow up at discharge with [facility] by calling [phone] to make an appointment.      Case discussed with Dr. Vargas. Primary team updated.       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 Patient notes that earlier on 5/1 she was walking and notes that her legs gave out, unable to effectively describe the sensation experienced. She felt as if she was going to fall over however her friend's son, who lives with her, caught her from falling. She denies any sensation of dizziness, lightheaded, pre-syncopal episode, claims to have remembered all of the events. Had an episode similar to this early Monday morning where she had gotten up, went to her living room and then when she got back to her bed she experienced an episode of weakness as she describes it where her legs had also given out during that time, resulting in hitting her head on the floor where she bruised her right eye with continuing ecchymosis.  She claims it could have been secondarily to the valium she took prior to bed which she will occasionally take for insomnia. Denies any localizing infectious symptomatology such as sob, abdominal pain, nausea, vomiting, diarrhea, or dysuria, urinary frequency, foul smelling urine.     With regards to her pmhx she follow up with Pulmonology at [facility], Cardiology at [place], Surgery at [place]. Yesterday she obtained a biopsy for a recent finding of an esophageal/larynx mass that was found on w/u for persistent sore throat and during this procedure she was put under anesthesia.     In the Ed:  T: 98 HR: 79 BP: 107/67 RR: 15 @ 98% RA  Labs: MCV of 101.1, glucose of 342 with no AG, chlroide of 90, bicarb of 30, creatinine of 1.36 w/ egfr of 39, bun of 28, potassium of 5.3, sodium of 130  Images: None  Chest xray: None  EKG: AFib with rate control  Interventions: Eliquis 5 mg qd, prednisone 20 mg, NS 1L      Endocrinology has been consulted for Diabetes Management.    DIABETES HISTORY  - Age at diagnosis: 2-3 years ago. Patient follows with many subspecialist at [facility] (ENT/ Hemonc/ Ortho/ Cardio/ Sleep) and Pulmonoligst at [facility]. Has not seen PCP in a year in the process of seeing all the specialists. She was told she had diabetes by her cardiologist but that "it wasn't that bad". Never started on medication. She is currently on a prednisone taper. Now at 10mg a day which she takes at bedtime and is planned to complete one more week.   - Diabetes managed outpatient by: No one.   - Current Therapy: jardiance 10mg prescribed suspect by cardiologist for HF??   - History of other regimens: None   - Home glucose readings: Does not check. Does not have glucometer. Reports knowing how to check because she helped take care of her late husbands diabetes.   - History of DKA/HHS: None   - Diabetic Complications: Unclear. Denies retinopathy. Denies neuropathy   - Diet:    She no longer cooks as it is difficult to stand by the stove due to back pain.   She mostly orders out. Sometimes eats only 1-2 meals a day.   Breakfast/ lunch: often just a blueberry muffin  Dinner: usually take out. Often pasta/ fish. Occasionally chinese food  Sugar free ginger ale, no other soda or juice  Does not eat fruit.       FAMILY HISTORY  - Diabetes: Denies. Parents did not have diabetes. She has no siblings or children.     SOCIAL HISTORY  - Work: retired . She currently lives with her friend's son who is not involved in her health care. She ambulates mostly with a cane but does not use at home as she has many things to hold on to.   - Alcohol: Denies  - Smoking: Active smoker 1 pack/ day life long. States she quit 3 weeks ago.       CURRENT MEDICATIONS  acetaminophen     Tablet .. 650 milliGRAM(s) Oral every 6 hours PRN  albuterol/ipratropium for Nebulization 3 milliLiter(s) Nebulizer every 6 hours PRN  apixaban 5 milliGRAM(s) Oral every 12 hours  atorvastatin 80 milliGRAM(s) Oral at bedtime  dextrose 5%. 1000 milliLiter(s) IV Continuous <Continuous>  dextrose 5%. 1000 milliLiter(s) IV Continuous <Continuous>  dextrose 50% Injectable 25 Gram(s) IV Push once  dextrose 50% Injectable 12.5 Gram(s) IV Push once  dextrose 50% Injectable 25 Gram(s) IV Push once  dextrose Oral Gel 15 Gram(s) Oral once PRN  ezetimibe 10 milliGRAM(s) Oral daily  fluticasone propionate/ salmeterol 100-50 MICROgram(s) Diskus 1 Dose(s) Inhalation two times a day  glucagon  Injectable 1 milliGRAM(s) IntraMuscular once  insulin glargine Injectable (LANTUS) 15 Unit(s) SubCutaneous at bedtime  insulin lispro (ADMELOG) corrective regimen sliding scale   SubCutaneous three times a day before meals  insulin lispro Injectable (ADMELOG) 6 Unit(s) SubCutaneous three times a day before meals  metoprolol succinate  milliGRAM(s) Oral daily  pantoprazole    Tablet 40 milliGRAM(s) Oral before breakfast  predniSONE   Tablet 10 milliGRAM(s) Oral every 24 hours  tiotropium 2.5 MICROgram(s) Inhaler 2 Puff(s) Inhalation daily      REVIEW OF SYSTEMS  Constitutional:  Negative fever, chills   Cardiovascular:  Negative for chest pain or palpitations.  Respiratory:  Negative for cough, wheezing, or shortness of breath.   Gastrointestinal:  Negative for nausea, vomiting, diarrhea, constipation, or abdominal pain.  Genitourinary:  Negative frequency, urgency or dysuria.  Neurologic:  No headache, confusion, dizziness    PHYSICAL EXAM  Vital Signs Last 24 Hrs  T(C): 36.6 (02 May 2025 08:49), Max: 36.8 (02 May 2025 05:56)  T(F): 97.9 (02 May 2025 08:49), Max: 98.3 (02 May 2025 05:56)  HR: 66 (02 May 2025 08:49) (66 - 103)  BP: 97/61 (02 May 2025 08:49) (97/61 - 124/85)  BP(mean): --  RR: 17 (02 May 2025 08:49) (15 - 18)  SpO2: 94% (02 May 2025 08:49) (92% - 98%)    Parameters below as of 02 May 2025 08:49  Patient On (Oxygen Delivery Method): room air      Constitutional: Awake, alert  HEENT: Normocephalic, Right eye hematoma noted.   Neck: supple, no acanthosis, no thyromegaly or palpable thyroid nodules.  Respiratory: Lungs clear to ausculation bilaterally.   Cardiovascular: regular rate and rhythm  GI: soft, non-tender, non-distended, bowel sounds present  Extremities: No lower extremity edema, peripheral pulses present.     LABS  CBC - WBC/HGB/HTC/PLT: 7.58/12.5/36.7/55 (05-02-25)  BMP: Na/K/Cl/Bicarb/BUN/Cr/Gluc: 133/5.2/95/27/23/1.16/445 (05-02-25)  Anion Gap: 11 (05-02-25)  eGFR: 47 (05-02-25)  Calcium: 8.7 (05-02-25)  Phosphorus: 3.2 (05-02-25)  Magnesium: 2.1 (05-02-25)      Thyroid Stimulating Hormone, Serum: 0.249 (05-02-25)  Total T4/Free T4: --/1.180 (05-02-25)  CBC - WBC/HGB/HTC/PLT: 7.58/12.5/36.7/55 (05-02-25)BMP: Na/K/Cl/Bicarb/BUN/Cr/Gluc: 133/5.2/95/27/23/1.16/445 (05-02-25)  Anion Gap: 11 (05-02-25)  eGFR: 47 (05-02-25)  Calcium: 8.7 (05-02-25)  Phosphorus: 3.2 (05-02-25)  Magnesium: 2.1 (05-02-25)    CAPILLARY BLOOD GLUCOSE & INSULIN RECEIVED  342 mg/dL (05-01 @ 19:48)  332 mg/dL (05-01 @ 21:22)  314 mg/dL (05-01 @ 22:54)   444 mg/dL (05-02 @ 07:27) NPH 15, lispro 12, oatmeal/ eggs/ turkey/ fruit cup for breakfast   413 mg/dL (05-02 @ 09:09)  274 mg/dL (05-02 @ 12:38) 6 + 6, caesar salad/ beef stew but did not finish as she had to go for CT scan.       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 Patient lives with her friend's son who caught her fall prior to coming to ED. She had a fall earlier in the week in her bedroom which resulted in a right supraorbital hematoma. Both times, she felt her legs giving out. OF note, she is currently also on a 5 week prednisone taper ordered by her pulmonologist Dr. Bernal at [facility] for a COPD exacerbation inflammatory changes found on outpatient CT of chest. She started at 50mg at the end of March with plan to taper 10 mg a week. She started 10mg a day today. Found to be hyperglycemic here. She reports being told she had diabetes but it was not "that bad" and was not on any medications. She is on Jardiance 10mg qd but suspect that was started by cardiologist for heart failure??. Diet at home is mostly take out as she can no longer cook for herself. She is only eating 1-2 meals a day at most. Endocrinology consulted for diabetes management.     A1C: 10.2 %  Creatinine: 1.16 GFR: 47  Weight: 105 BMI: 37.4    #Uncontrolled Type 2 diabetes mellitus with steroid induced hyperglycemia   - Start lantus 24 units at bedtime  - Start lispro 8 units before meals   - Continue lispro moderate dose sliding scale before meals and at bedtime.  - Patient's fingerstick glucose goal is 100-180 mg/dL.    - For steroid: would recommend switching prednisone to am  - Given improvement in lung exam/ shortness of breath and no wheezing would recommend the following: Prednisone 10mg on 5/3 at 9am, then taper to 5mg on 5/4 at same time and complete 6 days to finish her prednisone course. Will need to monitor for adrenal insufficiency as prednisone is tapered.   - Check Cortisol am level on 5/5 am (before prednisone is given).   - TSH noted 0.249 likely suppressed from steroid. Free T4 1.18 normal.   - Discharge recommendations TBD. Ideally while on prednisone she should be on insulin but she is likely not a good candidate for insulin. She does not have glucometer at home.   - If she does go home vs rehab, she needs glucometer/ dexcom sent to her local pharmacy as [facility] does not process medicare.   - Diabetic educator following   - Patient can follow up at discharge with [facility] by calling [phone] to make an appointment.      Case discussed with Dr. Vargas. Primary team updated.       Margarita Guerra  Endocrinology Fellow    Service Pager: [phone]

## 2025-05-02 NOTE — ADVANCED PRACTICE NURSE CONSULT - ASSESSMENT
Pt requires ongoing diabetes education, medical and support for this new diagnosis ( diabetes mellitus, according to IRENE Hoyos). Pt requested, of CDCES, that oral medications for the diabetes be considered.

## 2025-05-02 NOTE — CONSULT NOTE ADULT - ATTENDING COMMENTS
Pt seen on rounds this afternoon.  80-yo woman with HTN, HLP, chronic AF, COPD (likely smoking-related), type 2 DM and recently discovered esophageal mass was admitted after a fall at home.  Says that her "legs just gave out."    Was started on a course of Prednisone approximately one month ago for COPD exacerbation, initial dose 50 mg.  She apparently extended her prescribed taper somewhat, and is still on the Prednisone at 10 mg/day.  Has a history of diet-controlled DM, but was not monitoring fingersticks at home, and although she denies any recent polyuria/dipsia, her blood sugar was 342 on presentation.  She had remained in the 350-450 range until noon today, when she improved somewhat to 274 after a 15 units dose of Lantus plus sliding scale coverage.  She was breathing comfortably at the time of our visit, and had essentially no wheezing on auscultation.  Strength in her LEs was actually 5/5 on static testing.  She had multiple ecchymoses on her extremities, as well as a large ecchymosis in the R periorbital area  Issues at this point are as follows:  --She clearly has a steroid-induced exacerbation of diet-controlled type 2 DM  --Clearly requires insulin at present but is not a good candidate for maintenance insulin  --Needs to have steroids reduced or stopped in order to bring her blood glucoses into a range where she might respond to oral agents.  --She has no wheezing at present, and would consider decreasing her Prednisone to 5 mg/day  --However, she has been on steroids for sufficient duration and dosage that she almost certainly has adrenal suppression, ronda sincd she has been taking the steroids at night  Will therefore hold tonight's dose, restart tomorrow AM, keep on AM schedule  Would give 10 mg tomorrow AM, but then consider decrease to 5 mg qAM  Send AM cortisol level on 5/5 to see if adrenal suppression is present.  If so, will need a longer taper below 5 mg, or change to hydrocortisone for further tapering  To start basal/bolus insulin with Lantus 24/premeal lispro 8 units  --Knows how to do fingersticks ( had DM) but will need to obtain a meter post discharge

## 2025-05-02 NOTE — H&P ADULT - NSHPLABSRESULTS_GEN_ALL_CORE
13.3   8.01  )-----------( 61       ( 01 May 2025 20:08 )             38.2       05-01    130[L]  |  90[L]  |  28[H]  ----------------------------<  343[H]  5.3   |  30  |  1.36[H]    Ca    9.1      01 May 2025 20:08                Urinalysis Basic - ( 01 May 2025 20:08 )    Color: x / Appearance: x / SG: x / pH: x  Gluc: 343 mg/dL / Ketone: x  / Bili: x / Urobili: x   Blood: x / Protein: x / Nitrite: x   Leuk Esterase: x / RBC: x / WBC x   Sq Epi: x / Non Sq Epi: x / Bacteria: x            Lactate Trend            CAPILLARY BLOOD GLUCOSE      POCT Blood Glucose.: 314 mg/dL (01 May 2025 22:54)

## 2025-05-02 NOTE — PROGRESS NOTE ADULT - PROBLEM SELECTOR PLAN 2
Patient with unknown baseline of creatinine at this time, most likely has ckd but unclear what stage, appears to have  taken jardiance at one point but unclear if still taking. Will follow up creatinine and egfr in the morning after 1.5 L have been administered, possibly pre-renal if she is not currently at her baseline. Elevated BUN of course has multiple indications but can include dehydration.     Plan;  - F/u creatinine in AM  - Cystatin C  - Urine studies  - Avoid nephrotoxic medications  - Renally dose medications Cr 1.36 on adm. Improved to 1.16 after 1.5L IVF. Baseline 0.7 in 7/2024 (OSH Cards note).  FeNa 1.4% (indeterminant; on diuretic), FeUrea 42% (intrinsic)  Ddx: Likely intrinsic from poor perfusion (given +orthostat, normotensive despite holding antihypertensives, recent procedure on 4/30).  S/p 1.5L IVF (EF 55-60% per Echo 1/2025).    Plan  - continue to hold torsemide and diltiazem to prevent hypotension/poor perfusion    - Renally dose medications

## 2025-05-02 NOTE — PATIENT PROFILE ADULT - FUNCTIONAL ASSESSMENT - BASIC MOBILITY 6.
3-calculated by average/Not able to assess (calculate score using [facility] averaging method) Declines

## 2025-05-02 NOTE — H&P ADULT - HISTORY OF PRESENT ILLNESS
In the Ed:  T: 98 HR: 79 BP: 107/67 RR: 15 @ 98% RA  Labs: MCV of 101.1, glucose of 342 with no AG, chlroide of 90, bicarb of 30, creatinine of 1.36 w/ egfr of 39, bun of 28, potassium of 5.3, sodium of 130  Images: None  Chest xray: None  EKG: AFib with rate control  Interventions: Eliquis 5 mg qd, prednisone 20 mg, NS 1L   Ms. 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 Patient notes that earlier on 5/1 she was walking and notes that her legs gave out, unable to effectively describe the sensation experienced. She felt as if she was going to fall over however her friend's son, who lives with her, caught her from falling. She denies any sensation of dizziness, lightheaded, pre-syncopal episode, claims to have remembered all of the events. Had an episode similar to this early Monday morning where she had gotten up, went to her living room and then when she got back to her bed she experienced an episode of weakness as she describes it where her legs had also given out during that time, resulting in hitting her head on the floor where she bruised her right eye with continuing ecchymosis.  She claims it could have been secondarily to the valium she took prior to bed which she will occasionally take for insomnia. Denies any localizing infectious symptomatology such as sob, abdominal pain, nausea, vomiting, diarrhea, or dysuria, urinary frequency, foul smelling urine.     With regards to her pmhx she follow up with Pulmonology at [facility], Cardiology at [place], Surgery at [place]. Yesterday she obtained a biopsy for a recent finding of an esophageal/larynx mass that was found on w/u for persistent sore throat and during this procedure she was put under anesthesia.     In the Ed:  T: 98 HR: 79 BP: 107/67 RR: 15 @ 98% RA  Labs: MCV of 101.1, glucose of 342 with no AG, chlroide of 90, bicarb of 30, creatinine of 1.36 w/ egfr of 39, bun of 28, potassium of 5.3, sodium of 130  Images: None  Chest xray: None  EKG: AFib with rate control  Interventions: Eliquis 5 mg qd, prednisone 20 mg, NS 1L

## 2025-05-02 NOTE — PROGRESS NOTE ADULT - PROBLEM SELECTOR PLAN 3
Sodium of 130. Takes loop diuretic which can contribute and would fit under hypovolemic picture consequently. Also higher than what is documented when correcting for hyperglycemia. Was provided 1L NS in ED.     Plan;   - F/u Am Na  - Urine Na and osm will be altered given that she already received 1L NS in ED #New onset DM II  Pt denies being dx'ed w/ DM II in the past. A1c 10.2% (5/2/2025). Pt currently on prednisone taper for inflamm changes seen on CT (by OSH pulm). Denies any increase in urinary frequency, dehydration. AG wnl.     Plan:  - Endo consulted; appreciate rec  - Starting glargine 15u qhs and lispro 6u TID; s/p NPH 15u and lispro 12u x1 this AM for BG in 400s.   - iSS

## 2025-05-02 NOTE — PROGRESS NOTE ADULT - PROBLEM SELECTOR PLAN 8
Patient with history of hypertension. Takes diltiazem 120 mg qd, toprol 200 mg qd, and torsemide 20 mg qd.     Plan;  - Patient currently normotensive, will hold dilt and torsemide at this time  - Restart home medications if patient becomes hypertensive    #Hyperlipidemia  Patient takes atorvastatin 80 mg qhs and zetia 10 mg qd.    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 On Trelegy at home.  Followed by Dr. Thomas at [facility]. Per collateral w/ NP at his office, pt started on prednisone 50mg qd on 4/4 for inflammatory changes seen on CT (3/2025 w/ enlarging LISA solid nodule largest 1.3cm w/ smooth margins and lobulated contours w/ underlying scattered mild chronic foci of tree-in-bud opacity, suggesting foci of mucoid impaction. Also showing new multifocal ground glass foci likely inflammatory changes). Pt was supposed to taper to 40mg x4d starting on 4/14, then down by 10mg every 4d. However, pt reports schedule of needing to transition to 10mg starting on 5/2, which was likely 2/2 previous instruction by pulm office to taper down by 10mg every 7d. Pulm NP also reports pt testing positive for MAC per sputum cx on 4/13/25; plan of tx after steroid was completed.     Plan:   - C/w home medication  - Will start prednisone 10mg qd x 7d (5/2-5/8) as original OSH pulm rec especially in light of possible adrenal insufficiency from being on long-term steroid; would want to avoid steroid to be tapered off too quickly.      #SHEY  Patient compliant with bipap at home.     Plan:   - C/w bipap at night

## 2025-05-02 NOTE — PROGRESS NOTE ADULT - PROBLEM SELECTOR PLAN 4
Patient noted to take prednisone taper for unclear lung condition that her pulmonologist provided her with. Is supposed to start 10 mg tomorrow for 7 days. Has never had elevated sugars prior and most likely is secondarily to the steroid taper she has been on. Denies any increase in urinary frequency, dehydration. AG wnl.     Plan:  - iSS  - A1c  - Obtain more collateral from outpatient pulmonologist regarding steroid taper Plt 61 on adm. Baseline 90-100s (per OSH pulm and card notes).   Chronic problem for pt; followed by hematologist OSH (Dr. Jaspal Reeder MD ([address] [address]) - [phone]). Pt reports not being on any medications for it. 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   - ctm  - pending records from hematologist office (called on 5/2)

## 2025-05-02 NOTE — H&P ADULT - PROBLEM SELECTOR PLAN 1
Patient admits to episode yesterday where her legs gave out, leading to her almost falling over if someone was not present to prevent such from happening. Similar episode happened early Monday morning but it can be possibly attributed to valium use prior to sleeping. 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   Remembers everything that happened at both events denying lightheadedness, dizziness, pre-sycnope. HR is wnl at this time, takes Eliquis and Toprol daily iso afib and neuro exam without focal deficits. Eats well throughout day but does take multiple blood pressure lowering medications. Is s/p 1L NS in ED. Has hyperglycemia but most likely secondarily to steroid taper she has been on from her pulmonologist and denies urinary frequency, feelings of dehydration and AG wnl on blood work.     Plan:   - Orthostatics  - PT/OT  - Additional 500 cc's of LR  - Monitor blood pressure  - Sliding scale as needed  - CT head given recent fall Patient admits to episode yesterday where her legs gave out, leading to her almost falling over if someone was not present to prevent such from happening. Similar episode happened early Monday morning but it can be possibly attributed to valium use prior to sleeping. 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   Remembers everything that happened at both events denying lightheadedness, dizziness, pre-sycnope. HR is wnl at this time, takes Eliquis and Toprol daily iso afib and neuro exam without focal deficits. Eats well throughout day but does take multiple blood pressure lowering medications however patient without lightheadedness, dizziness, is s/p 1L NS in ED. Has hyperglycemia but most likely secondarily to steroid taper she has been on from her pulmonologist and denies urinary frequency, feelings of dehydration and AG wnl on blood work. Takes gabapentin for pain control for her arthritis but shouldn't be largely be contributing to above symptoms.     Plan:   - Orthostatics  - PT/OT  - Additional 500 cc's of LR  - Monitor blood pressure  - Sliding scale as needed  - CT head given recent fall  - Collateral from Cardiologist at [place] Patient admits to episode yesterday where her legs gave out, leading to her almost falling over if someone was not present to prevent such from happening. Similar episode happened early Monday morning but it can be possibly attributed to valium use prior to sleeping. 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   Remembers everything that happened at both events denying lightheadedness, dizziness, pre-sycnope. HR is wnl at this time, takes Eliquis and Toprol daily iso afib and neuro exam without focal deficits. Eats well throughout day but does take multiple blood pressure lowering medications however patient without lightheadedness, dizziness, is s/p 1L NS in ED. Has hyperglycemia but most likely secondarily to steroid taper she has been on from her pulmonologist and denies urinary frequency, feelings of dehydration and AG wnl on blood work. Takes gabapentin for pain control for her arthritis but shouldn't be largely be contributing to above symptoms.     Plan:   - Orthostatics  - PT/OT  - Additional 500 cc's of LR  - Monitor blood pressure  - Sliding scale as needed  - Collateral from Cardiologist at [place]

## 2025-05-02 NOTE — PATIENT PROFILE ADULT - FALL HARM RISK - HARM RISK INTERVENTIONS

## 2025-05-02 NOTE — H&P ADULT - PROBLEM SELECTOR PLAN 2
Patient with unknown baseline of creatinine at this time, most likely has ckd but unclear what stage. Will follow up creatinine and egfr in the morning after 1.5 L have been administered, possibly pre-renal if not currently at her baseline as BUN is also elevated which has multiple indications including dehydration.     Plan;  - F/u creatinine in AM  - Cystatin C  - Urine studies  - Avoid nephrotoxic medicaitons  - Renally dose medications Patient with unknown baseline of creatinine at this time, most likely has ckd but unclear what stage. Will follow up creatinine and egfr in the morning after 1.5 L have been administered, possibly pre-renal if she is not currently at her baseline. Elevated BUN of course has multiple indications but can include dehydration.     Plan;  - F/u creatinine in AM  - Cystatin C  - Urine studies  - Avoid nephrotoxic medications  - Renally dose medications Patient with unknown baseline of creatinine at this time, most likely has ckd but unclear what stage, appears to have  taken jardiance at one point but unclear if still taking. Will follow up creatinine and egfr in the morning after 1.5 L have been administered, possibly pre-renal if she is not currently at her baseline. Elevated BUN of course has multiple indications but can include dehydration.     Plan;  - F/u creatinine in AM  - Cystatin C  - Urine studies  - Avoid nephrotoxic medications  - Renally dose medications Patient with reported normal baseline of creatinine at this time, most likely has ckd but unclear what stage, appears to have  taken jardiance at one point but unclear if still taking. Will follow up creatinine and egfr in the morning after 1.5 L have been administered, possibly pre-renal if she is not currently at her baseline. Elevated BUN of course has multiple indications but can include dehydration.     Plan;  - F/u creatinine in AM  - Cystatin C  - Urine studies  - Avoid nephrotoxic medications  - Renally dose medications

## 2025-05-02 NOTE — H&P ADULT - PROBLEM SELECTOR PLAN 8
Patient with history of hypertension. Takes diltiazem 120 mg qd, toprol 200 mg qd, and torsemide 20 mg qd.     Plan;  - Patient currently normotensive, will hold dilt and torsemide at this time  - Restart home medications if patient becomes hypertensive    #Hyperlipidemia  Patient takes atorvastatin 80 mg qhs and zetia 10 mg qd.    Plan:   - C/w home medications

## 2025-05-02 NOTE — ED ADULT NURSE REASSESSMENT NOTE - NS ED NURSE REASSESS COMMENT FT1
report to CATHY Phillip at [facility]. Pending EMS transport. pt aware, receptive.

## 2025-05-02 NOTE — H&P ADULT - ATTENDING COMMENTS
Patient was seen and examined at bedside on 5/2/2025 at 11 am. Patient reports improvement of symptoms, was able to ambulate to bathroom without weakness or lightheadedness. Denies SOB, CP. ROS is otherwise negative. Vitals, labwork and pertinent imaging reviewed. Exam - NAD, AAO x 4, ecchymosis of R periorbital area, EOMI, MMM, supple neck, abd - NTND, + BS, back - midline, ext - wwp, psych - calm affect, skin - no rash    Plan:  -Endocrinology consult, patient likely has steroid induced hyperglycemia and evaluation of possible adrenal insufficiency? (patient reports symptoms s/p recent procedure (biopsy of known neck mass)  -D/w her outpatient pulmonologist regarding prescribed steroid taper/course  -Patient has reported long standing history of thrombocytopenia - reports close follow up with hematologist but unclear etiology - will obtain collateral  -Patient reports being on Eliquis despite Watchman placement due to recurrent thrombosis

## 2025-05-02 NOTE — H&P ADULT - PROBLEM SELECTOR PLAN 9
Patient with history of arthritis, notes to be taking gabapentin 300 mg qd for pain control.    Plan:   - C/w home medication

## 2025-05-02 NOTE — H&P ADULT - ASSESSMENT
Ms. 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 Ms. 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

## 2025-05-02 NOTE — PROGRESS NOTE ADULT - ASSESSMENT
Ms. 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 Ms. 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

## 2025-05-02 NOTE — H&P ADULT - NSHPPHYSICALEXAM_GEN_ALL_CORE
.  VITAL SIGNS:  T(C): 36.6 (05-01-25 @ 22:06), Max: 36.7 (05-01-25 @ 19:41)  T(F): 97.9 (05-01-25 @ 22:06), Max: 98 (05-01-25 @ 19:41)  HR: 82 (05-01-25 @ 22:06) (79 - 82)  BP: 124/85 (05-01-25 @ 22:06) (107/67 - 124/85)  BP(mean): --  RR: 16 (05-01-25 @ 22:06) (15 - 16)  SpO2: 96% (05-01-25 @ 22:06) (96% - 98%)  Wt(kg): --    PHYSICAL EXAM:    Constitutional: WDWN resting comfortably in bed; NAD  Head: NC/AT  Eyes: PERRL, EOMI, anicteric sclera  ENT: no nasal discharge; uvula midline, no oropharyngeal erythema or exudates; MMM  Neck: supple; no JVD or thyromegaly  Respiratory: CTA B/L; no W/R/R, no retractions  Cardiac: +S1/S2; RRR; no M/R/G; PMI non-displaced  Gastrointestinal: soft, NT/ND; no rebound or guarding; +BSx4  Genitourinary: normal external genitalia  Back: spine midline, no bony tenderness or step-offs; no CVAT B/L  Extremities: WWP, no clubbing or cyanosis; no peripheral edema  Musculoskeletal: NROM x4; no joint swelling, tenderness or erythema  Vascular: 2+ radial, femoral, DP/PT pulses B/L  Dermatologic: skin warm, dry and intact; no rashes, wounds, or scars  Lymphatic: no submandibular or cervical LAD  Neurologic: AAOx3; CNII-XII grossly intact; no focal deficits  Psychiatric: affect and characteristics of appearance, verbalizations, behaviors are appropriate

## 2025-05-02 NOTE — PROGRESS NOTE ADULT - PROBLEM SELECTOR PLAN 5
Patient with macrocytic findings but not anemic.     Plan:  - F/u b12  - F/u folate (IMPROVED)  Sodium of 130 on adm. 133 on 5/2.   Takes loop diuretic which can contribute and would fit under hypovolemic picture consequently. Also higher than what is documented when correcting for hyperglycemia. S/p 1.5L  - ctm

## 2025-05-02 NOTE — H&P ADULT - PROBLEM SELECTOR PLAN 4
Patient noted to take prednisone taper for unclear lung condition that her pulmonologist provided her with. Is supposed to start 10 mg tomorrow for 7 days. Has never had elevated sugars prior and most likely is secondarily to the steroid taper she has been on. Denies any increase in urinary frequency, dehydration. AG wnl.     Plan: Patient noted to take prednisone taper for unclear lung condition that her pulmonologist provided her with. Is supposed to start 10 mg tomorrow for 7 days. Has never had elevated sugars prior and most likely is secondarily to the steroid taper she has been on. Denies any increase in urinary frequency, dehydration. AG wnl.     Plan:  - iSS Patient noted to take prednisone taper for unclear lung condition that her pulmonologist provided her with. Is supposed to start 10 mg tomorrow for 7 days. Has never had elevated sugars prior and most likely is secondarily to the steroid taper she has been on. Denies any increase in urinary frequency, dehydration. AG wnl.     Plan:  - iSS  - Obtain more collateral from outpatient pulmonologist regarding steroid taper  - Will c/w prednisone taper for now given patient's knowledge on condition Patient noted to take prednisone taper for unclear lung condition that her pulmonologist provided her with. Is supposed to start 10 mg tomorrow for 7 days. Has never had elevated sugars prior and most likely is secondarily to the steroid taper she has been on. Denies any increase in urinary frequency, dehydration. AG wnl.     Plan:  - iSS  - A1c  - Obtain more collateral from outpatient pulmonologist regarding steroid taper Patient noted to take prednisone taper for unclear lung condition that her pulmonologist provided her with. Is supposed to start 10 mg tomorrow for 7 days. Has never had elevated sugars prior and most likely is secondarily to the steroid taper she has been on. Denies any increase in urinary frequency, dehydration. AG wnl.     Plan:  - iSS  - A1c - 10  - Obtain more collateral from outpatient pulmonologist regarding steroid taper

## 2025-05-02 NOTE — PROGRESS NOTE ADULT - PROBLEM SELECTOR PLAN 1
Patient admits to episode yesterday where her legs gave out, leading to her almost falling over if someone was not present to prevent such from happening. Similar episode happened early Monday morning but it can be possibly attributed to valium use prior to sleeping. 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   Remembers everything that happened at both events denying lightheadedness, dizziness, pre-sycnope. HR is wnl at this time, takes Eliquis and Toprol daily iso afib and neuro exam without focal deficits. Eats well throughout day but does take multiple blood pressure lowering medications however patient without lightheadedness, dizziness, is s/p 1L NS in ED. Has hyperglycemia but most likely secondarily to steroid taper she has been on from her pulmonologist and denies urinary frequency, feelings of dehydration and AG wnl on blood work. Takes gabapentin for pain control for her arthritis but shouldn't be largely be contributing to above symptoms.     Plan:   - Orthostatics  - PT/OT  - Additional 500 cc's of LR  - Monitor blood pressure  - Sliding scale as needed  - Collateral from Cardiologist at [place] Reports episode on 5/1 w/ legs suddently gave out, causing a near fall (friend's son who lives w/ her caught her). Similar episode on early Monday AM, causing a fall w/ head trauma (neg CT imaging reported by pt at urgent care). Recalls all events, no pre-syncopal sx, good po intake.   Ddx: Polypharmacy (toprol, torsemide, diltiazem, valium prn, gabapentin) vs orthostat (many antihypertensives) vs adrenal insufficiency (prolonged steroid use since 4/4 for inflmm changes seen on CT chest per OSH pulm & biopsy of esophageal/larynx mass w/ anesthesia on 4/30)  Orthostat + w/ BP but asymptomatic (5/2); currently s/p 1.5L IVF    Plan:   - Compression stockings  - PT/OT   - Monitor blood pressure; holding torsemide and diltiazem for now as pt normotensive w/o them  - Holding valium and gabapentin as well

## 2025-05-02 NOTE — PROGRESS NOTE ADULT - PROBLEM SELECTOR PLAN 6
Patient with history of afib. Currently rate controlled, takes eliquis 5 mg BID and toprol 200 mg per surescripts.     Plan:   - C/w home medications  - Collateral from [facility] Patient with macrocytic findings but not anemic.     Plan:  - F/u b12, folate

## 2025-05-02 NOTE — PROGRESS NOTE ADULT - PROBLEM SELECTOR PLAN 9
Patient with history of arthritis, notes to be taking gabapentin 300 mg qd for pain control.    Plan:   - C/w home medication Patient with history of hypertension. Takes diltiazem 120 mg qd, toprol 200 mg qd, and torsemide 20 mg qd.     Plan;  - Patient currently normotensive, will hold dilt and torsemide at this time  - Restart home medications if patient becomes hypertensive    #Hyperlipidemia  Patient takes atorvastatin 80 mg qhs and zetia 10 mg qd.    Plan:   - C/w home medications

## 2025-05-02 NOTE — H&P ADULT - PROBLEM SELECTOR PLAN 3
Most likely close to baseline in nature given that she takes loop diuretic for blood pressure. Was provided 1L NS in ED.     Plan;   - F/u Am Na  - Urine Na and osm will be altered given that she already received 1L NS in ED  - Can consider serum osm but seemingly most likely to loop diuretic at this time Possibly close to baseline in nature given that she takes loop diuretic for blood pressure. Was provided 1L NS in ED.     Plan;   - F/u Am Na  - Urine Na and osm will be altered given that she already received 1L NS in ED Sodium of 130. Takes loop diuretic which can contribute and would fit under hypovolemic picture consequently. Also higher than what is documented when correcting for hyperglycemia. Was provided 1L NS in ED.     Plan;   - F/u Am Na  - Urine Na and osm will be altered given that she already received 1L NS in ED

## 2025-05-02 NOTE — PROGRESS NOTE ADULT - PROBLEM SELECTOR PLAN 10
Patient takes omeprazole 40 mg qd at home.    Plan;   - C/w home medication Patient with history of arthritis, notes to be taking gabapentin 300 mg qd for pain control.    Plan:   - holding gabapentin as it could be contributing to b/l leg weakness  - tylenol PRN for pain

## 2025-05-02 NOTE — ADVANCED PRACTICE NURSE CONSULT - REASON FOR CONSULT
[facility] contacted by IRENE Hoyos re: pt admitted uncontrolled diabetes mellitus.  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  [facility] noted that pt had more than one fall. During the latter - her housemate caught her, during the former - she struck her head.  Pt informed 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  Pt indicated that she had meals on wheels previously, but cancelled that service ( taste).

## 2025-05-02 NOTE — H&P ADULT - PROBLEM SELECTOR PLAN 7
Patient with history of COPD. Smoking since age 10, quit three weeks ago. On Trelegy at home.    Plan:   - C/w home medication    #SHEY  Patient compliant with bipap at home.     Plan:   - C/w bipap at night

## 2025-05-03 DIAGNOSIS — R42 DIZZINESS AND GIDDINESS: ICD-10-CM

## 2025-05-03 DIAGNOSIS — I48.20 CHRONIC ATRIAL FIBRILLATION, UNSPECIFIED: ICD-10-CM

## 2025-05-03 DIAGNOSIS — E11.65 TYPE 2 DIABETES MELLITUS WITH HYPERGLYCEMIA: ICD-10-CM

## 2025-05-03 LAB
ANION GAP SERPL CALC-SCNC: 9 MMOL/L — SIGNIFICANT CHANGE UP (ref 5–17)
BASOPHILS # BLD AUTO: 0 K/UL — SIGNIFICANT CHANGE UP (ref 0–0.2)
BASOPHILS NFR BLD AUTO: 0 % — SIGNIFICANT CHANGE UP (ref 0–2)
BUN SERPL-MCNC: 23 MG/DL — SIGNIFICANT CHANGE UP (ref 7–23)
CALCIUM SERPL-MCNC: 9 MG/DL — SIGNIFICANT CHANGE UP (ref 8.4–10.5)
CHLORIDE SERPL-SCNC: 99 MMOL/L — SIGNIFICANT CHANGE UP (ref 96–108)
CLOSURE TME COLL+EPINEP BLD: 41 K/UL — LOW (ref 150–400)
CO2 SERPL-SCNC: 28 MMOL/L — SIGNIFICANT CHANGE UP (ref 22–31)
CREAT SERPL-MCNC: 0.95 MG/DL — SIGNIFICANT CHANGE UP (ref 0.5–1.3)
EGFR: 60 ML/MIN/1.73M2 — SIGNIFICANT CHANGE UP
EGFR: 60 ML/MIN/1.73M2 — SIGNIFICANT CHANGE UP
EOSINOPHIL # BLD AUTO: 0 K/UL — SIGNIFICANT CHANGE UP (ref 0–0.5)
EOSINOPHIL NFR BLD AUTO: 0 % — SIGNIFICANT CHANGE UP (ref 0–6)
FOLATE SERPL-MCNC: 14.8 NG/ML — SIGNIFICANT CHANGE UP
GLUCOSE SERPL-MCNC: 283 MG/DL — HIGH (ref 70–99)
HCT VFR BLD CALC: 37.7 % — SIGNIFICANT CHANGE UP (ref 34.5–45)
HGB BLD-MCNC: 12.9 G/DL — SIGNIFICANT CHANGE UP (ref 11.5–15.5)
LYMPHOCYTES # BLD AUTO: 1.87 K/UL — SIGNIFICANT CHANGE UP (ref 1–3.3)
LYMPHOCYTES # BLD AUTO: 24.1 % — SIGNIFICANT CHANGE UP (ref 13–44)
MAGNESIUM SERPL-MCNC: 1.9 MG/DL — SIGNIFICANT CHANGE UP (ref 1.6–2.6)
MCHC RBC-ENTMCNC: 34.2 G/DL — SIGNIFICANT CHANGE UP (ref 32–36)
MCHC RBC-ENTMCNC: 36 PG — HIGH (ref 27–34)
MCV RBC AUTO: 105.3 FL — HIGH (ref 80–100)
MONOCYTES # BLD AUTO: 0.4 K/UL — SIGNIFICANT CHANGE UP (ref 0–0.9)
MONOCYTES NFR BLD AUTO: 5.2 % — SIGNIFICANT CHANGE UP (ref 2–14)
NEUTROPHILS # BLD AUTO: 5.28 K/UL — SIGNIFICANT CHANGE UP (ref 1.8–7.4)
NEUTROPHILS NFR BLD AUTO: 68.1 % — SIGNIFICANT CHANGE UP (ref 43–77)
PHOSPHATE SERPL-MCNC: 2.7 MG/DL — SIGNIFICANT CHANGE UP (ref 2.5–4.5)
PLATELET # BLD AUTO: 51 K/UL — LOW (ref 150–400)
POTASSIUM SERPL-MCNC: 4.8 MMOL/L — SIGNIFICANT CHANGE UP (ref 3.5–5.3)
POTASSIUM SERPL-SCNC: 4.8 MMOL/L — SIGNIFICANT CHANGE UP (ref 3.5–5.3)
RBC # BLD: 3.58 M/UL — LOW (ref 3.8–5.2)
RBC # FLD: 12.4 % — SIGNIFICANT CHANGE UP (ref 10.3–14.5)
SODIUM SERPL-SCNC: 136 MMOL/L — SIGNIFICANT CHANGE UP (ref 135–145)
VIT B12 SERPL-MCNC: 337 PG/ML — SIGNIFICANT CHANGE UP (ref 232–1245)
WBC # BLD: 7.76 K/UL — SIGNIFICANT CHANGE UP (ref 3.8–10.5)
WBC # FLD AUTO: 7.76 K/UL — SIGNIFICANT CHANGE UP (ref 3.8–10.5)

## 2025-05-03 PROCEDURE — 99233 SBSQ HOSP IP/OBS HIGH 50: CPT | Mod: GC

## 2025-05-03 RX ORDER — SODIUM CHLORIDE 9 G/1000ML
500 INJECTION, SOLUTION INTRAVENOUS ONCE
Refills: 0 | Status: COMPLETED | OUTPATIENT
Start: 2025-05-03 | End: 2025-05-03

## 2025-05-03 RX ORDER — OMEPRAZOLE 20 MG/1
1 CAPSULE, DELAYED RELEASE ORAL
Qty: 30 | Refills: 0
Start: 2025-05-03 | End: 2025-06-01

## 2025-05-03 RX ADMIN — TIOTROPIUM BROMIDE INHALATION SPRAY 2 PUFF(S): 3.12 SPRAY, METERED RESPIRATORY (INHALATION) at 12:34

## 2025-05-03 RX ADMIN — SODIUM CHLORIDE 1000 MILLILITER(S): 9 INJECTION, SOLUTION INTRAVENOUS at 14:24

## 2025-05-03 RX ADMIN — Medication 1 DOSE(S): at 18:26

## 2025-05-03 RX ADMIN — INSULIN LISPRO 4: 100 INJECTION, SOLUTION INTRAVENOUS; SUBCUTANEOUS at 09:09

## 2025-05-03 RX ADMIN — APIXABAN 5 MILLIGRAM(S): 2.5 TABLET, FILM COATED ORAL at 18:26

## 2025-05-03 RX ADMIN — EZETIMIBE 10 MILLIGRAM(S): 10 TABLET ORAL at 12:33

## 2025-05-03 RX ADMIN — INSULIN LISPRO 8 UNIT(S): 100 INJECTION, SOLUTION INTRAVENOUS; SUBCUTANEOUS at 09:09

## 2025-05-03 RX ADMIN — INSULIN LISPRO 6: 100 INJECTION, SOLUTION INTRAVENOUS; SUBCUTANEOUS at 12:33

## 2025-05-03 RX ADMIN — ATORVASTATIN CALCIUM 80 MILLIGRAM(S): 80 TABLET, FILM COATED ORAL at 22:10

## 2025-05-03 RX ADMIN — Medication 1 DOSE(S): at 06:48

## 2025-05-03 RX ADMIN — METOPROLOL SUCCINATE 200 MILLIGRAM(S): 50 TABLET, EXTENDED RELEASE ORAL at 05:24

## 2025-05-03 RX ADMIN — Medication 5 MILLIGRAM(S): at 22:10

## 2025-05-03 RX ADMIN — INSULIN LISPRO 6: 100 INJECTION, SOLUTION INTRAVENOUS; SUBCUTANEOUS at 22:31

## 2025-05-03 RX ADMIN — APIXABAN 5 MILLIGRAM(S): 2.5 TABLET, FILM COATED ORAL at 06:48

## 2025-05-03 RX ADMIN — Medication 40 MILLIGRAM(S): at 06:48

## 2025-05-03 RX ADMIN — INSULIN GLARGINE-YFGN 24 UNIT(S): 100 INJECTION, SOLUTION SUBCUTANEOUS at 22:32

## 2025-05-03 RX ADMIN — INSULIN LISPRO 8 UNIT(S): 100 INJECTION, SOLUTION INTRAVENOUS; SUBCUTANEOUS at 18:24

## 2025-05-03 RX ADMIN — INSULIN LISPRO 8 UNIT(S): 100 INJECTION, SOLUTION INTRAVENOUS; SUBCUTANEOUS at 12:34

## 2025-05-03 RX ADMIN — INSULIN LISPRO 2: 100 INJECTION, SOLUTION INTRAVENOUS; SUBCUTANEOUS at 18:25

## 2025-05-03 RX ADMIN — PREDNISONE 10 MILLIGRAM(S): 20 TABLET ORAL at 09:09

## 2025-05-03 NOTE — PROGRESS NOTE ADULT - PROBLEM SELECTOR PLAN 10
Patient with history of arthritis, notes to be taking gabapentin 300 mg qd for pain control.    Plan:   - holding gabapentin as it could be contributing to b/l leg weakness  - tylenol PRN for pain

## 2025-05-03 NOTE — PROGRESS NOTE ADULT - PROBLEM SELECTOR PLAN 5
(IMPROVED)  Sodium of 130 on adm. 133 on 5/2.   Takes loop diuretic which can contribute and would fit under hypovolemic picture consequently. Also higher than what is documented when correcting for hyperglycemia. S/p 1.5L  - ctm

## 2025-05-03 NOTE — DIETITIAN INITIAL EVALUATION ADULT - PROBLEM SELECTOR PROBLEM 2
RUSTY (acute kidney injury)
No fx on X-ray. Will discharge with RICE instructions, return precautions and f/u with PMD.

## 2025-05-03 NOTE — DIETITIAN INITIAL EVALUATION ADULT - ADD RECOMMEND
1. Continue Consistent Carbohydrate diet   2. Encourage and monitor PO intake, honor preferences as able   >> Consistently meet >75% of estimated needs during admission   >> Consider oral nutrition supplement or liberalizing diet should intake decline </= 50%   3. Monitor wt trends, GI function, skin integrity  4. Monitor lytes, renal indices, blood glucose, LFTs    5. Pain and bowel regimen per team

## 2025-05-03 NOTE — DIETITIAN INITIAL EVALUATION ADULT - PROBLEM SELECTOR PLAN 6
Patient with history of afib. Currently rate controlled, takes eliquis 5 mg BID and toprol 200 mg per surescripts.     Plan:   - C/w home medications  - Collateral from [place] Cardiologist

## 2025-05-03 NOTE — DIETITIAN INITIAL EVALUATION ADULT - PERTINENT LABORATORY DATA
05-03    136  |  99  |  23  ----------------------------<  283[H]  4.8   |  28  |  0.95    Ca    9.0      03 May 2025 05:30  Phos  2.7     05-03  Mg     1.9     05-03    POCT Blood Glucose.: 238 mg/dL (05-03-25 @ 08:50)  A1C with Estimated Average Glucose Result: 10.2 % (05-02-25 @ 07:35)

## 2025-05-03 NOTE — DIETITIAN INITIAL EVALUATION ADULT - PROBLEM SELECTOR PLAN 3
Sodium of 130. Takes loop diuretic which can contribute and would fit under hypovolemic picture consequently. Also higher than what is documented when correcting for hyperglycemia. Was provided 1L NS in ED.     Plan;   - F/u Am Na  - Urine Na and osm will be altered given that she already received 1L NS in ED

## 2025-05-03 NOTE — DIETITIAN INITIAL EVALUATION ADULT - PROBLEM SELECTOR PLAN 4
Patient noted to take prednisone taper for unclear lung condition that her pulmonologist provided her with. Is supposed to start 10 mg tomorrow for 7 days. Has never had elevated sugars prior and most likely is secondarily to the steroid taper she has been on. Denies any increase in urinary frequency, dehydration. AG wnl.     Plan:  - iSS  - A1c - 10  - Obtain more collateral from outpatient pulmonologist regarding steroid taper

## 2025-05-03 NOTE — DIETITIAN INITIAL EVALUATION ADULT - PROBLEM SELECTOR PLAN 1
Patient admits to episode yesterday where her legs gave out, leading to her almost falling over if someone was not present to prevent such from happening. Similar episode happened early Monday morning but it can be possibly attributed to valium use prior to sleeping. 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   Remembers everything that happened at both events denying lightheadedness, dizziness, pre-sycnope. HR is wnl at this time, takes Eliquis and Toprol daily iso afib and neuro exam without focal deficits. Eats well throughout day but does take multiple blood pressure lowering medications however patient without lightheadedness, dizziness, is s/p 1L NS in ED. Has hyperglycemia but most likely secondarily to steroid taper she has been on from her pulmonologist and denies urinary frequency, feelings of dehydration and AG wnl on blood work. Takes gabapentin for pain control for her arthritis but shouldn't be largely be contributing to above symptoms.     Plan:   - Orthostatics  - PT/OT  - Additional 500 cc's of LR  - Monitor blood pressure  - Sliding scale as needed  - Collateral from Cardiologist at [place]

## 2025-05-03 NOTE — OCCUPATIONAL THERAPY INITIAL EVALUATION ADULT - ADDITIONAL COMMENTS
Patient reports living with her friends son in an private home with 5 NATALIIA. Patient states she was mostly independent with all ADL's, IADL's and functional mobility with SC. Patient is R hand dominant and has a bathtub shower with shower chair.

## 2025-05-03 NOTE — PHYSICAL THERAPY INITIAL EVALUATION ADULT - GAIT DISTANCE, PT EVAL
30ft x 2; further ambulation deferred due Pt report of back pain. Of note Pt demos +orthostatics post ambulation (SBP drop 30points), however Pt asymptomatic--CATHY Case and MD Mcfarlane aware

## 2025-05-03 NOTE — PROGRESS NOTE ADULT - PROBLEM SELECTOR PLAN 1
Reports episode on 5/1 w/ legs suddently gave out, causing a near fall (friend's son who lives w/ her caught her). Similar episode on early Monday AM, causing a fall w/ head trauma (neg CT imaging reported by pt at urgent care). Recalls all events, no pre-syncopal sx, good po intake.   Ddx: Polypharmacy (toprol, torsemide, diltiazem, valium prn, gabapentin) vs orthostat (many antihypertensives) vs adrenal insufficiency (prolonged steroid use since 4/4 for inflmm changes seen on CT chest per OSH pulm & biopsy of esophageal/larynx mass w/ anesthesia on 4/30)  Orthostat + w/ BP but asymptomatic (5/2); currently s/p 1.5L IVF    Plan:   - Compression stockings  - PT/OT   - Monitor blood pressure; holding torsemide and diltiazem for now as pt normotensive w/o them  - Holding valium and gabapentin as well Reports episode on 5/1 w/ legs suddently gave out, causing a near fall (friend's son who lives w/ her caught her). Similar episode on early Monday AM, causing a fall w/ head trauma (neg CT imaging reported by pt at urgent care). Recalls all events, no pre-syncopal sx, good po intake.   Ddx: Polypharmacy (toprol, torsemide, diltiazem, valium prn, gabapentin) vs orthostat (many antihypertensives) vs adrenal insufficiency (prolonged steroid use since 4/4 for inflmm changes seen on CT chest per OSH pulm & biopsy of esophageal/larynx mass w/ anesthesia on 4/30)  Orthostat + w/ BP but asymptomatic (5/2); currently s/p 1.5L IVF    Plan:   - Compression stockings  - PT/OT   - Monitor blood pressure; holding torsemide and diltiazem for now as pt normotensive w/o them  - Holding valium and gabapentin as well  - PT rec home PT and OT. rolling walker

## 2025-05-03 NOTE — PROGRESS NOTE ADULT - SUBJECTIVE AND OBJECTIVE BOX
OVERNIGHT EVENTS:    SUBJECTIVE / INTERVAL HPI: Patient seen and examined at bedside.     VITAL SIGNS:  Vital Signs Last 24 Hrs  T(C): 36.7 (03 May 2025 05:19), Max: 36.7 (02 May 2025 21:48)  T(F): 98 (03 May 2025 05:19), Max: 98 (02 May 2025 21:48)  HR: 99 (03 May 2025 06:54) (66 - 124)  BP: 122/87 (03 May 2025 05:19) (96/74 - 122/87)  BP(mean): --  RR: 18 (03 May 2025 05:19) (17 - 19)  SpO2: 98% (03 May 2025 05:19) (94% - 99%)    Parameters below as of 03 May 2025 05:19  Patient On (Oxygen Delivery Method): room air      I&O's Summary    02 May 2025 07:01  -  03 May 2025 07:00  --------------------------------------------------------  IN: 500 mL / OUT: 0 mL / NET: 500 mL        PHYSICAL EXAM:    General: NAD  HEENT: NC/AT; PERRL, anicteric sclera; MMM  Neck: supple  Cardiovascular: +S1/S2; RRR  Respiratory: CTA B/L; no W/R/R  Gastrointestinal: soft, NT/ND; +BSx4  Extremities: WWP; no edema, clubbing or cyanosis  Vascular: 2+ radial, DP/PT pulses B/L  Neurological: AAOx3; no focal deficits    MEDICATIONS:  MEDICATIONS  (STANDING):  apixaban 5 milliGRAM(s) Oral every 12 hours  atorvastatin 80 milliGRAM(s) Oral at bedtime  dextrose 5%. 1000 milliLiter(s) (50 mL/Hr) IV Continuous <Continuous>  dextrose 5%. 1000 milliLiter(s) (100 mL/Hr) IV Continuous <Continuous>  dextrose 50% Injectable 25 Gram(s) IV Push once  dextrose 50% Injectable 12.5 Gram(s) IV Push once  dextrose 50% Injectable 25 Gram(s) IV Push once  ezetimibe 10 milliGRAM(s) Oral daily  fluticasone propionate/ salmeterol 100-50 MICROgram(s) Diskus 1 Dose(s) Inhalation two times a day  glucagon  Injectable 1 milliGRAM(s) IntraMuscular once  insulin glargine Injectable (LANTUS) 24 Unit(s) SubCutaneous at bedtime  insulin lispro (ADMELOG) corrective regimen sliding scale   SubCutaneous Before meals and at bedtime  insulin lispro Injectable (ADMELOG) 8 Unit(s) SubCutaneous three times a day before meals  melatonin 5 milliGRAM(s) Oral at bedtime  metoprolol succinate  milliGRAM(s) Oral daily  pantoprazole    Tablet 40 milliGRAM(s) Oral before breakfast  predniSONE   Tablet 10 milliGRAM(s) Oral once  tiotropium 2.5 MICROgram(s) Inhaler 2 Puff(s) Inhalation daily    MEDICATIONS  (PRN):  acetaminophen     Tablet .. 650 milliGRAM(s) Oral every 6 hours PRN Temp greater or equal to 38C (100.4F), Mild Pain (1 - 3)  albuterol/ipratropium for Nebulization 3 milliLiter(s) Nebulizer every 6 hours PRN Shortness of Breath and/or Wheezing  dextrose Oral Gel 15 Gram(s) Oral once PRN Blood Glucose LESS THAN 70 milliGRAM(s)/deciliter      ALLERGIES:  Allergies    No Known Allergies    Intolerances        LABS:                        12.9   7.76  )-----------( 51       ( 03 May 2025 05:30 )             37.7     05-03    136  |  99  |  23  ----------------------------<  283[H]  4.8   |  28  |  0.95    Ca    9.0      03 May 2025 05:30  Phos  2.7     05-03  Mg     1.9     05-03        Urinalysis Basic - ( 03 May 2025 05:30 )    Color: x / Appearance: x / SG: x / pH: x  Gluc: 283 mg/dL / Ketone: x  / Bili: x / Urobili: x   Blood: x / Protein: x / Nitrite: x   Leuk Esterase: x / RBC: x / WBC x   Sq Epi: x / Non Sq Epi: x / Bacteria: x      CAPILLARY BLOOD GLUCOSE      POCT Blood Glucose.: 182 mg/dL (02 May 2025 22:06)      RADIOLOGY & ADDITIONAL TESTS: Reviewed.   OVERNIGHT EVENTS: GAYLE    SUBJECTIVE / INTERVAL HPI: Patient seen and examined at bedside. Patient feels well. Denies pain. No acute complaints.    VITAL SIGNS:  Vital Signs Last 24 Hrs  T(C): 36.7 (03 May 2025 05:19), Max: 36.7 (02 May 2025 21:48)  T(F): 98 (03 May 2025 05:19), Max: 98 (02 May 2025 21:48)  HR: 99 (03 May 2025 06:54) (66 - 124)  BP: 122/87 (03 May 2025 05:19) (96/74 - 122/87)  BP(mean): --  RR: 18 (03 May 2025 05:19) (17 - 19)  SpO2: 98% (03 May 2025 05:19) (94% - 99%)    Parameters below as of 03 May 2025 05:19  Patient On (Oxygen Delivery Method): room air      I&O's Summary    02 May 2025 07:01  -  03 May 2025 07:00  --------------------------------------------------------  IN: 500 mL / OUT: 0 mL / NET: 500 mL        PHYSICAL EXAM:    General: Alert and oriented x 3.   HEENT: Moist mucous membranes. Purple ecchymosis over R eye. No other traumatic injuries identified on head.  Cardiovascular: Irregularly irregular rhythm. No murmur  Lungs: Mild expiratory wheezes bilaterally  Abdomen: Soft, non-tender and non-distended.    Extremities: No edema. Non-tender. Warm.  Skin: No rashes or lesions.   Neurologic: No apparent focal neurological deficits. CN II-XII grossly intact, but not individually tested.  Psychiatric: Cooperative. Appropriate mood and affect.    MEDICATIONS:  MEDICATIONS  (STANDING):  apixaban 5 milliGRAM(s) Oral every 12 hours  atorvastatin 80 milliGRAM(s) Oral at bedtime  dextrose 5%. 1000 milliLiter(s) (50 mL/Hr) IV Continuous <Continuous>  dextrose 5%. 1000 milliLiter(s) (100 mL/Hr) IV Continuous <Continuous>  dextrose 50% Injectable 25 Gram(s) IV Push once  dextrose 50% Injectable 12.5 Gram(s) IV Push once  dextrose 50% Injectable 25 Gram(s) IV Push once  ezetimibe 10 milliGRAM(s) Oral daily  fluticasone propionate/ salmeterol 100-50 MICROgram(s) Diskus 1 Dose(s) Inhalation two times a day  glucagon  Injectable 1 milliGRAM(s) IntraMuscular once  insulin glargine Injectable (LANTUS) 24 Unit(s) SubCutaneous at bedtime  insulin lispro (ADMELOG) corrective regimen sliding scale   SubCutaneous Before meals and at bedtime  insulin lispro Injectable (ADMELOG) 8 Unit(s) SubCutaneous three times a day before meals  melatonin 5 milliGRAM(s) Oral at bedtime  metoprolol succinate  milliGRAM(s) Oral daily  pantoprazole    Tablet 40 milliGRAM(s) Oral before breakfast  predniSONE   Tablet 10 milliGRAM(s) Oral once  tiotropium 2.5 MICROgram(s) Inhaler 2 Puff(s) Inhalation daily    MEDICATIONS  (PRN):  acetaminophen     Tablet .. 650 milliGRAM(s) Oral every 6 hours PRN Temp greater or equal to 38C (100.4F), Mild Pain (1 - 3)  albuterol/ipratropium for Nebulization 3 milliLiter(s) Nebulizer every 6 hours PRN Shortness of Breath and/or Wheezing  dextrose Oral Gel 15 Gram(s) Oral once PRN Blood Glucose LESS THAN 70 milliGRAM(s)/deciliter      ALLERGIES:  Allergies    No Known Allergies    Intolerances        LABS:                        12.9   7.76  )-----------( 51       ( 03 May 2025 05:30 )             37.7     05-03    136  |  99  |  23  ----------------------------<  283[H]  4.8   |  28  |  0.95    Ca    9.0      03 May 2025 05:30  Phos  2.7     05-03  Mg     1.9     05-03        Urinalysis Basic - ( 03 May 2025 05:30 )    Color: x / Appearance: x / SG: x / pH: x  Gluc: 283 mg/dL / Ketone: x  / Bili: x / Urobili: x   Blood: x / Protein: x / Nitrite: x   Leuk Esterase: x / RBC: x / WBC x   Sq Epi: x / Non Sq Epi: x / Bacteria: x      CAPILLARY BLOOD GLUCOSE      POCT Blood Glucose.: 182 mg/dL (02 May 2025 22:06)      RADIOLOGY & ADDITIONAL TESTS: Reviewed.

## 2025-05-03 NOTE — PROGRESS NOTE ADULT - PROBLEM SELECTOR PLAN 8
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 On Trelegy at home.  Followed by Dr. Thomas at [facility]. Per collateral w/ NP at his office, pt started on prednisone 50mg qd on 4/4 for inflammatory changes seen on CT (3/2025 w/ enlarging LISA solid nodule largest 1.3cm w/ smooth margins and lobulated contours w/ underlying scattered mild chronic foci of tree-in-bud opacity, suggesting foci of mucoid impaction. Also showing new multifocal ground glass foci likely inflammatory changes). Pt was supposed to taper to 40mg x4d starting on 4/14, then down by 10mg every 4d. However, pt reports schedule of needing to transition to 10mg starting on 5/2, which was likely 2/2 previous instruction by pulm office to taper down by 10mg every 7d. Pulm NP also reports pt testing positive for MAC per sputum cx on 4/13/25; plan of tx after steroid was completed.     Plan:   - C/w home medication  - Will start prednisone 10mg qd x 7d (5/2-5/8) as original OSH pulm rec especially in light of possible adrenal insufficiency from being on long-term steroid; would want to avoid steroid to be tapered off too quickly.      #SHEY  Patient compliant with bipap at home.     Plan:   - C/w bipap at night

## 2025-05-03 NOTE — PROGRESS NOTE ADULT - ATTENDING COMMENTS
Patient was seen and examined at bedside on 5/3/2025 at 11 am. Patient reports improvement of symptoms, was able to ambulate to bathroom without weakness or lightheadedness. Denies SOB, CP. ROS is otherwise negative. Vitals, labwork and pertinent imaging reviewed. Exam - NAD, AAO x 4, ecchymosis of R periorbital area, EOMI, MMM, supple neck, abd - NTND, + BS, back - midline, ext - wwp, psych - calm affect, skin - no rash    Plan:  -Endocrinology consult, patient likely has steroid induced hyperglycemia and evaluation of possible adrenal insufficiency? (patient reports symptoms s/p recent procedure (biopsy of known neck mass), will check Cortisol on 5/5 AM  -D/w her outpatient pulmonologist regarding prescribed steroid taper/course  -Patient has reported long standing history of thrombocytopenia - reports close follow up with hematologist but unclear etiology - will obtain collateral  -Patient reports being on Eliquis despite Watchman placement due to recurrent thrombosis  -PT/OT rec HPT

## 2025-05-03 NOTE — DIETITIAN INITIAL EVALUATION ADULT - PERTINENT MEDS FT
MEDICATIONS  (STANDING):  apixaban 5 milliGRAM(s) Oral every 12 hours  atorvastatin 80 milliGRAM(s) Oral at bedtime  dextrose 5%. 1000 milliLiter(s) (50 mL/Hr) IV Continuous <Continuous>  dextrose 5%. 1000 milliLiter(s) (100 mL/Hr) IV Continuous <Continuous>  dextrose 50% Injectable 25 Gram(s) IV Push once  dextrose 50% Injectable 12.5 Gram(s) IV Push once  dextrose 50% Injectable 25 Gram(s) IV Push once  ezetimibe 10 milliGRAM(s) Oral daily  fluticasone propionate/ salmeterol 100-50 MICROgram(s) Diskus 1 Dose(s) Inhalation two times a day  glucagon  Injectable 1 milliGRAM(s) IntraMuscular once  insulin glargine Injectable (LANTUS) 24 Unit(s) SubCutaneous at bedtime  insulin lispro (ADMELOG) corrective regimen sliding scale   SubCutaneous Before meals and at bedtime  insulin lispro Injectable (ADMELOG) 8 Unit(s) SubCutaneous three times a day before meals  melatonin 5 milliGRAM(s) Oral at bedtime  metoprolol succinate  milliGRAM(s) Oral daily  pantoprazole    Tablet 40 milliGRAM(s) Oral before breakfast  tiotropium 2.5 MICROgram(s) Inhaler 2 Puff(s) Inhalation daily    MEDICATIONS  (PRN):  acetaminophen     Tablet .. 650 milliGRAM(s) Oral every 6 hours PRN Temp greater or equal to 38C (100.4F), Mild Pain (1 - 3)  albuterol/ipratropium for Nebulization 3 milliLiter(s) Nebulizer every 6 hours PRN Shortness of Breath and/or Wheezing  dextrose Oral Gel 15 Gram(s) Oral once PRN Blood Glucose LESS THAN 70 milliGRAM(s)/deciliter

## 2025-05-03 NOTE — PROGRESS NOTE ADULT - PROBLEM SELECTOR PLAN 2
Cr 1.36 on adm. Improved to 1.16 after 1.5L IVF. Baseline 0.7 in 7/2024 (OSH Cards note).  FeNa 1.4% (indeterminant; on diuretic), FeUrea 42% (intrinsic)  Ddx: Likely intrinsic from poor perfusion (given +orthostat, normotensive despite holding antihypertensives, recent procedure on 4/30).  S/p 1.5L IVF (EF 55-60% per Echo 1/2025).    Plan  - continue to hold torsemide and diltiazem to prevent hypotension/poor perfusion    - Renally dose medications

## 2025-05-03 NOTE — OCCUPATIONAL THERAPY INITIAL EVALUATION ADULT - PERTINENT HX OF CURRENT PROBLEM, REHAB EVAL
Ms. 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 Patient notes that earlier on 5/1 she was walking and notes that her legs gave out, unable to effectively describe the sensation experienced. She felt as if she was going to fall over however her friend's son, who lives with her, caught her from falling. She denies any sensation of dizziness, lightheaded, pre-syncopal episode, claims to have remembered all of the events. Had an episode similar to this early Monday morning where she had gotten up, went to her living room and then when she got back to her bed she experienced an episode of weakness as she describes it where her legs had also given out during that time, resulting in hitting her head on the floor where she bruised her right eye with continuing ecchymosis.  She claims it could have been secondarily to the valium she took prior to bed which she will occasionally take for insomnia. Denies any localizing infectious symptomatology such as sob, abdominal pain, nausea, vomiting, diarrhea, or dysuria, urinary frequency, foul smelling urine.

## 2025-05-03 NOTE — PHYSICAL THERAPY INITIAL EVALUATION ADULT - GENERAL OBSERVATIONS, REHAB EVAL
Pt received in bathroom in NAD, +HEP lock, +R eye bruising, +OT Duncan present, RN Evie cleared Pt for PT/OT eval. Pt is AO x 4 and agreeable to participate in PT session.

## 2025-05-03 NOTE — OCCUPATIONAL THERAPY INITIAL EVALUATION ADULT - GENERAL OBSERVATIONS, REHAB EVAL
PT Shanique present. Patient received on toilet in bathroom +heplock IV, room air, c/o of lower back pain throughout 2/2 chronic arthritis. Patient A&Ox4, agreeable and tolerated session fairly.

## 2025-05-03 NOTE — DIETITIAN INITIAL EVALUATION ADULT - OTHER INFO
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     Pt seen for nutrition assessment. Resting in bed, able to articulate nutrition hx. Ordered for Consistent Carbohydrate diet. Confirms no known food allergies. Denies difficulty chewing/swallowing, has dentures. Reports good appetite this morning, consumed 100% of breakfast tray. PTA, pt reports varied intake due to inability to prepare meals for herself. States she suffers from arthritis and cannot stand for very long. Orders most meals out. No cultural, ethnic, or Sikh food preferences reported. Pt unsure of usual body weight, but denies recent weight loss. Observed with no overt signs or symptoms of muscle or fat wasting. Based on ASPEN guidelines, pt does not meet criteria for malnutrition at this time. Labs and medications reviewed. Electrolytes WNL, glucose 283-445 x 24 hours, HgbA1c 10.2%<high> (5/2/25). Ordered for 24U LANTUS at bedtime, 8U premeal ADMELOG TID, insulin corrective sliding scale, prednisone, protonix. Skin: no pressure injuries or edema documented, Nicola score 16. GI: no report of nausea, vomiting, diarrhea, constipation; +BM 5/1 per flowsheets. See nutrition recommendations below.

## 2025-05-03 NOTE — PROGRESS NOTE ADULT - PROBLEM SELECTOR PLAN 12
Recently found outpatient. Patient states to have had sore throat which led to workup with nasal scope for visualization of her throat evidencing mass that was found. Biopsied yesterday. Follows with Dr. Ribeiro from [place].    Plan:   - Can attempt to obtain more collateral        Prophylactic measure.   ·  Plan: F: S/p 1.5 L  E: Replete as needed  N: CCD  DVT proph: Eliquis 5 mg BID  GI Proph: Pantoprazole   Dispo: [facility].

## 2025-05-03 NOTE — PROGRESS NOTE ADULT - PROBLEM SELECTOR PLAN 4
Plt 61 on adm. Baseline 90-100s (per OSH pulm and card notes).   Chronic problem for pt; followed by hematologist OSH (Dr. Jaspal Reeder MD ([address] [address]) - [phone]). Pt reports not being on any medications for it. 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   - ctm  - pending records from hematologist office (called on 5/2)

## 2025-05-03 NOTE — DIETITIAN INITIAL EVALUATION ADULT - OTHER CALCULATIONS
pounds, %  Pt above % ideal body weight thus ideal body weight used for all calculations. Needs adjusted for advanced age, COPD, BMI>30.

## 2025-05-03 NOTE — PHYSICAL THERAPY INITIAL EVALUATION ADULT - PERTINENT HX OF CURRENT PROBLEM, REHAB EVAL
Ms. 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

## 2025-05-03 NOTE — PROGRESS NOTE ADULT - PROBLEM SELECTOR PLAN 3
#New onset DM II  Pt denies being dx'ed w/ DM II in the past. A1c 10.2% (5/2/2025). Pt currently on prednisone taper for inflamm changes seen on CT (by OSH pulm). Denies any increase in urinary frequency, dehydration. AG wnl.     Plan:  - Endo consulted; appreciate rec  - Starting glargine 15u qhs and lispro 6u TID; s/p NPH 15u and lispro 12u x1 this AM for BG in 400s.   - iSS

## 2025-05-03 NOTE — DIETITIAN INITIAL EVALUATION ADULT - PERSON TAUGHT/METHOD
RD encouraged continued PO intake as tolerated. Reviewed consistent carbohydrate diet recommendations including limiting simple sugars such as candy, juice, soda and prioritizing lean proteins, whole grains, fruits, vegetables. Discussed pairing CHO with protein/fiber/healthy fats. Pt verbalized appreciation and understanding. Interested in [facility] upon discharge- RD will submit referral pending discharge./verbal instruction/patient instructed

## 2025-05-03 NOTE — DIETITIAN INITIAL EVALUATION ADULT - PROBLEM SELECTOR PLAN 2
Patient with reported normal baseline of creatinine at this time, most likely has ckd but unclear what stage, appears to have  taken jardiance at one point but unclear if still taking. Will follow up creatinine and egfr in the morning after 1.5 L have been administered, possibly pre-renal if she is not currently at her baseline. Elevated BUN of course has multiple indications but can include dehydration.     Plan;  - F/u creatinine in AM  - Cystatin C  - Urine studies  - Avoid nephrotoxic medications  - Renally dose medications

## 2025-05-03 NOTE — OCCUPATIONAL THERAPY INITIAL EVALUATION ADULT - MODIFIED CLINICAL TEST OF SENSORY INTEGRATION IN BALANCE TEST
Patient functionally ambulated in the hallway with SC and CGA. Patient with kyphotic posture and antalgic gait 2/2 lower back pain from arthritis- would benefit from RW for mobility.

## 2025-05-03 NOTE — OCCUPATIONAL THERAPY INITIAL EVALUATION ADULT - DIAGNOSIS, OT EVAL
Patient brought to [facility] 2/2 recent weakness and almost fall, presents with R eye ecchymosis, deficits with balance, postural control, activity tolerance impacting independence with functional activities and mobility.

## 2025-05-03 NOTE — PROGRESS NOTE ADULT - PROBLEM SELECTOR PLAN 7
#Recurrent cardiac thrombus; s/p Watchman device  Currently rate controlled, takes eliquis 5 mg BID and toprol 200 mg qd.  Per OSH cardiologist at [facility], pt has had recurrent h/o cardiac thrombus despite LLA closure w/ Watchman whenever he is transitioned from eliquis to ASA (2/2 thrombocytopenia).   Last echo (1/2025): EF 55-60%. Diastolic fx unable to be determined 2/2 afib.     Plan:   - C/w home medications

## 2025-05-03 NOTE — PHYSICAL THERAPY INITIAL EVALUATION ADULT - GAIT DEVIATIONS NOTED, PT EVAL
mild unsteadiness, kyphotic posture, no overt LOB or knee buckling--trial RW next session/decreased frances/decreased step length/decreased weight-shifting ability

## 2025-05-03 NOTE — PHYSICAL THERAPY INITIAL EVALUATION ADULT - ADDITIONAL COMMENTS
Pt lives in an apartment with her friend's son with 5 steps to enter. She reports being independent at baseline for functional mobility and ADLs with use of straight cane for mobility. The Pt has a tub shower with shower chair.

## 2025-05-04 PROCEDURE — 99233 SBSQ HOSP IP/OBS HIGH 50: CPT | Mod: GC

## 2025-05-04 PROCEDURE — 93010 ELECTROCARDIOGRAM REPORT: CPT

## 2025-05-04 RX ORDER — POLYETHYLENE GLYCOL 3350 17 G/17G
17 POWDER, FOR SOLUTION ORAL
Refills: 0 | Status: DISCONTINUED | OUTPATIENT
Start: 2025-05-04 | End: 2025-05-06

## 2025-05-04 RX ORDER — MELATONIN 5 MG
5 TABLET ORAL ONCE
Refills: 0 | Status: COMPLETED | OUTPATIENT
Start: 2025-05-04 | End: 2025-05-04

## 2025-05-04 RX ORDER — SENNA 187 MG
2 TABLET ORAL AT BEDTIME
Refills: 0 | Status: DISCONTINUED | OUTPATIENT
Start: 2025-05-04 | End: 2025-05-06

## 2025-05-04 RX ADMIN — INSULIN LISPRO 8 UNIT(S): 100 INJECTION, SOLUTION INTRAVENOUS; SUBCUTANEOUS at 18:21

## 2025-05-04 RX ADMIN — TIOTROPIUM BROMIDE INHALATION SPRAY 2 PUFF(S): 3.12 SPRAY, METERED RESPIRATORY (INHALATION) at 13:07

## 2025-05-04 RX ADMIN — APIXABAN 5 MILLIGRAM(S): 2.5 TABLET, FILM COATED ORAL at 05:53

## 2025-05-04 RX ADMIN — ATORVASTATIN CALCIUM 80 MILLIGRAM(S): 80 TABLET, FILM COATED ORAL at 21:09

## 2025-05-04 RX ADMIN — INSULIN LISPRO 2: 100 INJECTION, SOLUTION INTRAVENOUS; SUBCUTANEOUS at 18:21

## 2025-05-04 RX ADMIN — INSULIN LISPRO 2: 100 INJECTION, SOLUTION INTRAVENOUS; SUBCUTANEOUS at 09:32

## 2025-05-04 RX ADMIN — PREDNISONE 5 MILLIGRAM(S): 20 TABLET ORAL at 09:10

## 2025-05-04 RX ADMIN — Medication 1 DOSE(S): at 17:05

## 2025-05-04 RX ADMIN — INSULIN LISPRO 2: 100 INJECTION, SOLUTION INTRAVENOUS; SUBCUTANEOUS at 13:22

## 2025-05-04 RX ADMIN — METOPROLOL SUCCINATE 200 MILLIGRAM(S): 50 TABLET, EXTENDED RELEASE ORAL at 05:53

## 2025-05-04 RX ADMIN — APIXABAN 5 MILLIGRAM(S): 2.5 TABLET, FILM COATED ORAL at 17:05

## 2025-05-04 RX ADMIN — INSULIN LISPRO 8 UNIT(S): 100 INJECTION, SOLUTION INTRAVENOUS; SUBCUTANEOUS at 09:32

## 2025-05-04 RX ADMIN — INSULIN LISPRO 8 UNIT(S): 100 INJECTION, SOLUTION INTRAVENOUS; SUBCUTANEOUS at 13:22

## 2025-05-04 RX ADMIN — EZETIMIBE 10 MILLIGRAM(S): 10 TABLET ORAL at 13:07

## 2025-05-04 RX ADMIN — Medication 5 MILLIGRAM(S): at 21:09

## 2025-05-04 RX ADMIN — Medication 5 MILLIGRAM(S): at 02:45

## 2025-05-04 RX ADMIN — Medication 650 MILLIGRAM(S): at 21:27

## 2025-05-04 RX ADMIN — POLYETHYLENE GLYCOL 3350 17 GRAM(S): 17 POWDER, FOR SOLUTION ORAL at 13:07

## 2025-05-04 RX ADMIN — Medication 2 TABLET(S): at 21:09

## 2025-05-04 RX ADMIN — INSULIN LISPRO 2: 100 INJECTION, SOLUTION INTRAVENOUS; SUBCUTANEOUS at 22:13

## 2025-05-04 RX ADMIN — Medication 40 MILLIGRAM(S): at 06:28

## 2025-05-04 RX ADMIN — Medication 1 DOSE(S): at 05:53

## 2025-05-04 RX ADMIN — INSULIN GLARGINE-YFGN 24 UNIT(S): 100 INJECTION, SOLUTION SUBCUTANEOUS at 22:12

## 2025-05-04 RX ADMIN — Medication 650 MILLIGRAM(S): at 22:27

## 2025-05-04 NOTE — PROGRESS NOTE ADULT - PROBLEM SELECTOR PLAN 8
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 On Trelegy at home.  Followed by Dr. Thomas at [facility]. Per collateral w/ NP at his office, pt started on prednisone 50mg qd on 4/4 for inflammatory changes seen on CT (3/2025 w/ enlarging LISA solid nodule largest 1.3cm w/ smooth margins and lobulated contours w/ underlying scattered mild chronic foci of tree-in-bud opacity, suggesting foci of mucoid impaction. Also showing new multifocal ground glass foci likely inflammatory changes). Pt was supposed to taper to 40mg x4d starting on 4/14, then down by 10mg every 4d. However, pt reports schedule of needing to transition to 10mg starting on 5/2, which was likely 2/2 previous instruction by pulm office to taper down by 10mg every 7d. Pulm NP also reports pt testing positive for MAC per sputum cx on 4/13/25; plan of tx after steroid was completed.     Plan:   - C/w home medication  - Will start prednisone 10mg qd x 7d (5/2-5/8) as original OSH pulm rec especially in light of possible adrenal insufficiency from being on long-term steroid; would want to avoid steroid to be tapered off too quickly.      #SHEY  Patient compliant with bipap at home.     Plan:   - C/w bipap at night

## 2025-05-04 NOTE — PROGRESS NOTE ADULT - SUBJECTIVE AND OBJECTIVE BOX
OVERNIGHT EVENTS: GAYLE    SUBJECTIVE / INTERVAL HPI: Patient seen and examined at bedside. Patient feels well. Denies pain. No acute complaints. ROS is otherwise negative.       Vital Signs Last 24 Hrs  T(C): 36.4 (04 May 2025 11:00), Max: 36.8 (03 May 2025 14:18)  T(F): 97.6 (04 May 2025 11:00), Max: 98.2 (03 May 2025 14:18)  HR: 91 (04 May 2025 11:00) (82 - 129)  BP: 99/67 (04 May 2025 11:00) (99/67 - 118/81)  BP(mean): 77 (04 May 2025 11:00) (77 - 90)  RR: 18 (04 May 2025 11:00) (16 - 18)  SpO2: 94% (04 May 2025 11:00) (94% - 99%)    Parameters below as of 04 May 2025 11:00  Patient On (Oxygen Delivery Method): room air            PHYSICAL EXAM:    General: Alert and oriented x 3.   HEENT: Moist mucous membranes. Purple ecchymosis over R eye. No other traumatic injuries identified on head.  Cardiovascular: Irregularly irregular rhythm. No murmur  Lungs: Mild expiratory wheezes bilaterally  Abdomen: Soft, non-tender and non-distended.    Extremities: No edema. Non-tender. Warm.  Skin: No rashes or lesions.   Neurologic: No apparent focal neurological deficits. CN II-XII grossly intact, but not individually tested.  Psychiatric: Cooperative. Appropriate mood and affect.      MEDICATIONS  (STANDING):  apixaban 5 milliGRAM(s) Oral every 12 hours  atorvastatin 80 milliGRAM(s) Oral at bedtime  dextrose 5%. 1000 milliLiter(s) (50 mL/Hr) IV Continuous <Continuous>  dextrose 5%. 1000 milliLiter(s) (100 mL/Hr) IV Continuous <Continuous>  dextrose 50% Injectable 25 Gram(s) IV Push once  dextrose 50% Injectable 12.5 Gram(s) IV Push once  dextrose 50% Injectable 25 Gram(s) IV Push once  ezetimibe 10 milliGRAM(s) Oral daily  fluticasone propionate/ salmeterol 100-50 MICROgram(s) Diskus 1 Dose(s) Inhalation two times a day  glucagon  Injectable 1 milliGRAM(s) IntraMuscular once  insulin glargine Injectable (LANTUS) 24 Unit(s) SubCutaneous at bedtime  insulin lispro (ADMELOG) corrective regimen sliding scale   SubCutaneous Before meals and at bedtime  insulin lispro Injectable (ADMELOG) 8 Unit(s) SubCutaneous three times a day before meals  melatonin 5 milliGRAM(s) Oral at bedtime  metoprolol succinate  milliGRAM(s) Oral daily  pantoprazole    Tablet 40 milliGRAM(s) Oral before breakfast  predniSONE   Tablet 5 milliGRAM(s) Oral every 24 hours  tiotropium 2.5 MICROgram(s) Inhaler 2 Puff(s) Inhalation daily    MEDICATIONS  (PRN):  acetaminophen     Tablet .. 650 milliGRAM(s) Oral every 6 hours PRN Temp greater or equal to 38C (100.4F), Mild Pain (1 - 3)  albuterol/ipratropium for Nebulization 3 milliLiter(s) Nebulizer every 6 hours PRN Shortness of Breath and/or Wheezing  dextrose Oral Gel 15 Gram(s) Oral once PRN Blood Glucose LESS THAN 70 milliGRAM(s)/deciliter          ALLERGIES:  Allergies    No Known Allergies    Intolerances        LABS:                                   12.9   7.76  )-----------( 51       ( 03 May 2025 05:30 )             37.7   05-03    136  |  99  |  23  ----------------------------<  283[H]  4.8   |  28  |  0.95    Ca    9.0      03 May 2025 05:30  Phos  2.7     05-03  Mg     1.9     05-03          CAPILLARY BLOOD GLUCOSE      POCT Blood Glucose.: 182 mg/dL (02 May 2025 22:06)      RADIOLOGY & ADDITIONAL TESTS: Reviewed.

## 2025-05-04 NOTE — PROGRESS NOTE ADULT - PROBLEM SELECTOR PLAN 1
Reports episode on 5/1 w/ legs suddently gave out, causing a near fall (friend's son who lives w/ her caught her). Similar episode on early Monday AM, causing a fall w/ head trauma (neg CT imaging reported by pt at urgent care). Recalls all events, no pre-syncopal sx, good po intake.   Ddx: Polypharmacy (toprol, torsemide, diltiazem, valium prn, gabapentin) vs orthostat (many antihypertensives) vs adrenal insufficiency (prolonged steroid use since 4/4 for inflmm changes seen on CT chest per OSH pulm & biopsy of esophageal/larynx mass w/ anesthesia on 4/30)  Orthostat + w/ BP but asymptomatic (5/2); currently s/p 1.5L IVF    Plan:   - Compression stockings  - PT/OT   - Monitor blood pressure; holding torsemide and diltiazem for now as pt normotensive w/o them  - Holding valium and gabapentin as well  - PT rec home PT and OT. rolling walker

## 2025-05-04 NOTE — PROGRESS NOTE ADULT - PROBLEM SELECTOR PLAN 7
#Recurrent cardiac thrombus; s/p Watchman device  Currently rate controlled, takes eliquis 5 mg BID and toprol 200 mg qd.  Per OSH cardiologist at [facility], pt has had recurrent h/o cardiac thrombus despite LLA closure w/ Watchman whenever he is transitioned from eliquis to ASA (2/2 thrombocytopenia).   Last echo (1/2025): EF 55-60%. Diastolic fx unable to be determined 2/2 afib.     Plan:   - C/w home medications

## 2025-05-05 ENCOUNTER — TRANSCRIPTION ENCOUNTER (OUTPATIENT)
Age: 80
End: 2025-05-05

## 2025-05-05 LAB
CORTIS AM PEAK SERPL-MCNC: 10.3 UG/DL — SIGNIFICANT CHANGE UP (ref 6.02–18.4)
CREAT SERPL-MCNC: 1 MG/DL — SIGNIFICANT CHANGE UP (ref 0.5–1.3)
CYSTATIN C SERPL-MCNC: 1.79 MG/L — HIGH (ref 0.7–1.45)
EGFR: 56 ML/MIN/1.73M2 — LOW
EGFR: 56 ML/MIN/1.73M2 — LOW
EGFRCR-CYS SERPLBLD CKD-EPI 2021: 43 ML/MIN/1.73M2 — LOW
GFR/BSA.PRED SERPLBLD CYS-BASED-ARV: 31 ML/MIN/1.73M2 — LOW

## 2025-05-05 PROCEDURE — 99239 HOSP IP/OBS DSCHRG MGMT >30: CPT

## 2025-05-05 PROCEDURE — 93010 ELECTROCARDIOGRAM REPORT: CPT

## 2025-05-05 RX ORDER — TORSEMIDE 10 MG
1 TABLET ORAL
Refills: 0 | DISCHARGE

## 2025-05-05 RX ORDER — METFORMIN HYDROCHLORIDE 850 MG/1
1 TABLET ORAL
Qty: 120 | Refills: 0
Start: 2025-05-05

## 2025-05-05 RX ORDER — ISOPROPYL ALCOHOL, BENZOCAINE .7; .06 ML/ML; ML/ML
0 SWAB TOPICAL
Qty: 100 | Refills: 1
Start: 2025-05-05

## 2025-05-05 RX ORDER — GABAPENTIN 400 MG/1
2 CAPSULE ORAL
Refills: 0 | DISCHARGE

## 2025-05-05 RX ORDER — SITAGLIPTIN 100 MG/1
1 TABLET, FILM COATED ORAL
Qty: 30 | Refills: 0
Start: 2025-05-05 | End: 2025-06-03

## 2025-05-05 RX ORDER — SODIUM CHLORIDE 9 G/1000ML
500 INJECTION, SOLUTION INTRAVENOUS ONCE
Refills: 0 | Status: COMPLETED | OUTPATIENT
Start: 2025-05-05 | End: 2025-05-05

## 2025-05-05 RX ORDER — PREDNISONE 20 MG/1
1 TABLET ORAL
Qty: 0 | Refills: 0 | DISCHARGE
Start: 2025-05-05

## 2025-05-05 RX ORDER — DILTIAZEM HYDROCHLORIDE 120 MG/1
1 CAPSULE, EXTENDED RELEASE ORAL
Refills: 0 | DISCHARGE

## 2025-05-05 RX ORDER — DEXTROMETHORPHAN HBR, GUAIFENESIN 200 MG/10ML
200 LIQUID ORAL ONCE
Refills: 0 | Status: COMPLETED | OUTPATIENT
Start: 2025-05-05 | End: 2025-05-06

## 2025-05-05 RX ADMIN — PREDNISONE 5 MILLIGRAM(S): 20 TABLET ORAL at 09:30

## 2025-05-05 RX ADMIN — TIOTROPIUM BROMIDE INHALATION SPRAY 2 PUFF(S): 3.12 SPRAY, METERED RESPIRATORY (INHALATION) at 12:15

## 2025-05-05 RX ADMIN — INSULIN LISPRO 2: 100 INJECTION, SOLUTION INTRAVENOUS; SUBCUTANEOUS at 18:11

## 2025-05-05 RX ADMIN — INSULIN LISPRO 8 UNIT(S): 100 INJECTION, SOLUTION INTRAVENOUS; SUBCUTANEOUS at 18:10

## 2025-05-05 RX ADMIN — INSULIN LISPRO 2: 100 INJECTION, SOLUTION INTRAVENOUS; SUBCUTANEOUS at 09:30

## 2025-05-05 RX ADMIN — INSULIN LISPRO 4: 100 INJECTION, SOLUTION INTRAVENOUS; SUBCUTANEOUS at 13:08

## 2025-05-05 RX ADMIN — Medication 40 MILLIGRAM(S): at 06:12

## 2025-05-05 RX ADMIN — INSULIN LISPRO 8 UNIT(S): 100 INJECTION, SOLUTION INTRAVENOUS; SUBCUTANEOUS at 09:29

## 2025-05-05 RX ADMIN — INSULIN LISPRO 2: 100 INJECTION, SOLUTION INTRAVENOUS; SUBCUTANEOUS at 22:13

## 2025-05-05 RX ADMIN — Medication 1 DOSE(S): at 05:50

## 2025-05-05 RX ADMIN — POLYETHYLENE GLYCOL 3350 17 GRAM(S): 17 POWDER, FOR SOLUTION ORAL at 05:50

## 2025-05-05 RX ADMIN — ATORVASTATIN CALCIUM 80 MILLIGRAM(S): 80 TABLET, FILM COATED ORAL at 22:14

## 2025-05-05 RX ADMIN — INSULIN GLARGINE-YFGN 24 UNIT(S): 100 INJECTION, SOLUTION SUBCUTANEOUS at 22:14

## 2025-05-05 RX ADMIN — INSULIN LISPRO 8 UNIT(S): 100 INJECTION, SOLUTION INTRAVENOUS; SUBCUTANEOUS at 13:08

## 2025-05-05 RX ADMIN — EZETIMIBE 10 MILLIGRAM(S): 10 TABLET ORAL at 12:15

## 2025-05-05 RX ADMIN — Medication 2 TABLET(S): at 22:14

## 2025-05-05 RX ADMIN — Medication 5 MILLIGRAM(S): at 22:14

## 2025-05-05 RX ADMIN — APIXABAN 5 MILLIGRAM(S): 2.5 TABLET, FILM COATED ORAL at 05:51

## 2025-05-05 RX ADMIN — METOPROLOL SUCCINATE 200 MILLIGRAM(S): 50 TABLET, EXTENDED RELEASE ORAL at 05:50

## 2025-05-05 RX ADMIN — Medication 1 DOSE(S): at 18:08

## 2025-05-05 RX ADMIN — SODIUM CHLORIDE 1000 MILLILITER(S): 9 INJECTION, SOLUTION INTRAVENOUS at 18:41

## 2025-05-05 RX ADMIN — APIXABAN 5 MILLIGRAM(S): 2.5 TABLET, FILM COATED ORAL at 18:08

## 2025-05-05 NOTE — PROGRESS NOTE ADULT - PROBLEM SELECTOR PLAN 6
RESOLVED  Sodium of 130 on adm. 133 on 5/2.   Takes loop diuretic which can contribute and would fit under hypovolemic picture consequently. Also higher than what is documented when correcting for hyperglycemia. S/p 1.5L  - ctm

## 2025-05-05 NOTE — PROGRESS NOTE ADULT - SUBJECTIVE AND OBJECTIVE BOX
SUBJECTIVE / INTERVAL HPI: Patient was seen and examined this morning.     Overnight events:    CAPILLARY BLOOD GLUCOSE & INSULIN RECEIVED  179 mg/dL (05-04 @ 08:51) 8 + 2  196 mg/dL (05-04 @ 13:10) 8 + 2  183 mg/dL (05-04 @ 17:47) 8 + 2  167 mg/dL (05-04 @ 22:07) lantus 24 + 2  194 mg/dL (05-05 @ 08:59) 8 + 2      REVIEW OF SYSTEMS  Constitutional:  Negative fever, chills   Cardiovascular:  Negative for chest pain or palpitations.  Respiratory:  Negative for cough, wheezing, or shortness of breath.    Gastrointestinal:  Negative for nausea, vomiting, diarrhea, constipation, or abdominal pain.  Genitourinary:  Negative frequency, urgency or dysuria.  Neurologic:  No headache, confusion, dizziness, lightheadedness.    PHYSICAL EXAM  Vital Signs Last 24 Hrs  T(C): 36.4 (05 May 2025 05:45), Max: 36.5 (04 May 2025 20:51)  T(F): 97.6 (05 May 2025 05:45), Max: 97.7 (04 May 2025 20:51)  HR: 111 (05 May 2025 05:45) (76 - 124)  BP: 113/81 (05 May 2025 05:45) (99/67 - 113/81)  BP(mean): 77 (04 May 2025 11:00) (77 - 77)  RR: 20 (05 May 2025 05:45) (16 - 27)  SpO2: 97% (05 May 2025 05:45) (94% - 99%)    Parameters below as of 05 May 2025 05:45  Patient On (Oxygen Delivery Method): room air        Constitutional: Awake, alert, in no acute distress.   HEENT: Normocephalic, atraumatic, DAVE.  Respiratory: Lungs clear to ausculation bilaterally.   Cardiovascular: regular rhythm, normal S1 and S2, no audible murmurs.   GI: soft, non-tender, non-distended, bowel sounds present.  Extremities: No lower extremity edema.     LABS  CBC - WBC/HGB/HTC/PLT: 7.76/12.9/37.7/51 (05-03-25)  BMP - Na/K/Cl/Bicarb/BUN/Cr/Gluc/AG/eGFR: 136/4.8/99/28/23/0.95/283/9/60 (05-03-25)  Ca - 9.0 (05-03-25)  Phos - 2.7 (05-03-25)  Mg - 1.9 (05-03-25)      Thyroid Stimulating Hormone, Serum: 0.249 (05-02-25)  Total T4/Free T4: --/1.180 (05-02-25)        MEDICATIONS  MEDICATIONS  (STANDING):  apixaban 5 milliGRAM(s) Oral every 12 hours  atorvastatin 80 milliGRAM(s) Oral at bedtime  dextrose 5%. 1000 milliLiter(s) (50 mL/Hr) IV Continuous <Continuous>  dextrose 5%. 1000 milliLiter(s) (100 mL/Hr) IV Continuous <Continuous>  dextrose 50% Injectable 25 Gram(s) IV Push once  dextrose 50% Injectable 12.5 Gram(s) IV Push once  dextrose 50% Injectable 25 Gram(s) IV Push once  ezetimibe 10 milliGRAM(s) Oral daily  fluticasone propionate/ salmeterol 100-50 MICROgram(s) Diskus 1 Dose(s) Inhalation two times a day  glucagon  Injectable 1 milliGRAM(s) IntraMuscular once  insulin glargine Injectable (LANTUS) 24 Unit(s) SubCutaneous at bedtime  insulin lispro (ADMELOG) corrective regimen sliding scale   SubCutaneous Before meals and at bedtime  insulin lispro Injectable (ADMELOG) 8 Unit(s) SubCutaneous three times a day before meals  melatonin 5 milliGRAM(s) Oral at bedtime  metoprolol succinate  milliGRAM(s) Oral daily  pantoprazole    Tablet 40 milliGRAM(s) Oral before breakfast  polyethylene glycol 3350 17 Gram(s) Oral two times a day  predniSONE   Tablet 5 milliGRAM(s) Oral every 24 hours  senna 2 Tablet(s) Oral at bedtime  tiotropium 2.5 MICROgram(s) Inhaler 2 Puff(s) Inhalation daily    MEDICATIONS  (PRN):  acetaminophen     Tablet .. 650 milliGRAM(s) Oral every 6 hours PRN Temp greater or equal to 38C (100.4F), Mild Pain (1 - 3)  albuterol/ipratropium for Nebulization 3 milliLiter(s) Nebulizer every 6 hours PRN Shortness of Breath and/or Wheezing  dextrose Oral Gel 15 Gram(s) Oral once PRN Blood Glucose LESS THAN 70 milliGRAM(s)/deciliter    ASSESSMENT / RECOMMENDATIONS  ASSESSMENT / RECOMMENDATIONS  JORGE DUNCAN is a  80 year old female with pmhx of Afib s/p watchman (following with EP and Cardio at [facility]), HTN, HLD, COPD, SHEY on NIV, Chronic arthritis and back pain, Thrombocytopenia of unclear etiology (following hemonc at [facility]), GERD, 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 Patient lives with her friend's son who caught her fall prior to coming to ED. She had a fall earlier in the week in her bedroom which resulted in a right supraorbital hematoma. Both times, she felt her legs giving out. OF note, she is currently also on a 5 week prednisone taper ordered by her pulmonologist Dr. eBrnal at [facility] for a COPD exacerbation inflammatory changes found on outpatient CT of chest. She started at 50mg at the end of March with plan to taper 10 mg a week. Now on prednisone 5mg a day for 5 more days.  Found to be hyperglycemic here. She reports being told she had diabetes but it was not "that bad" and was not on any medications. She is on Jardiance 10mg qd but suspect that was started by cardiologist for heart failure??. Diet at home is mostly take out as she can no longer cook for herself. She is only eating 1-2 meals a day at most. Endocrinology consulted for diabetes management.     A1C: 10.2 %  Creatinine: 1.16 GFR: 47  Weight: 105 BMI: 37.4    #Uncontrolled Type 2 diabetes mellitus with steroid induced hyperglycemia   - lantus 24 units at bedtime  - lispro 8 units before meals   - Continue lispro moderate dose sliding scale before meals and at bedtime.  - Patient's fingerstick glucose goal is 100-180 mg/dL.    - c/w prednisone 5mg qd to complete on May 10  - Am cortisol 10, low suspicion of adrenal insufficiency.   - TSH noted 0.249 likely suppressed from steroid. Free T4 1.18 normal.   - Discharge recommendations: _____   - If she does go home vs rehab, she needs glucometer/ dexcom sent to her local pharmacy as [facility] does not process medicare.   - Diabetic educator following   - Patient can follow up at discharge with [facility] by calling [phone] to make an appointment.      Case discussed with Dr. Vargas. Primary team updated.       Malaika Youssef   Endocrinology Fellow    Service Pager: [phone]

## 2025-05-05 NOTE — PROGRESS NOTE ADULT - PROBLEM SELECTOR PLAN 1
Reports episode on 5/1 w/ legs suddently gave out, causing a near fall (friend's son who lives w/ her caught her). Similar episode on early Monday AM, causing a fall w/ head trauma (neg CT imaging reported by pt at urgent care). Recalls all events, no pre-syncopal sx, good po intake.   Ddx: Polypharmacy (toprol, torsemide, diltiazem, valium prn, gabapentin) vs orthostat (many antihypertensives) vs adrenal insufficiency (prolonged steroid use since 4/4 for inflmm changes seen on CT chest per OSH pulm & biopsy of esophageal/larynx mass w/ anesthesia on 4/30)  Orthostat + w/ BP but asymptomatic (5/2); currently s/p 1.5L IVF  Repeat orthostat neg on 5/5 w/ compression stockings    Plan:   - Compression stockings  - PT/OT rec Home PT w/ rolling walker  - Continue to hold torsemide and diltiazem for now as pt normotensive w/o them  - Holding valium and gabapentin as well; can resume valium at dc just for prn insomnia use.

## 2025-05-05 NOTE — PROGRESS NOTE ADULT - PROBLEM SELECTOR PLAN 4
#New onset DM II  Pt denies being dx'ed w/ DM II in the past. A1c 10.2% (5/2/2025). Pt currently on prednisone taper for inflamm changes seen on CT (by OSH pulm). Denies any increase in urinary frequency, dehydration. AG wnl.     Plan:  - Endo consulted; appreciate rec  - glargine 24u qhs and lispro 8u TID  - iSS  - FInal endo rec: metformin 500mg bid (1000mg bid if tolerating for first wk), Januvia 100mg qd  - Provided script for diabetes supplies to bring to [facility] as other pharmacy will not cover medicare

## 2025-05-05 NOTE — CHART NOTE - NSCHARTNOTEFT_GEN_A_CORE
Confidential Drug Utilization Report  Search Terms: Leticia Finley, 1945Search Date: 05/05/2025 23:55:18 PM  Searching on behalf of: Myself  The Drug Utilization Report below displays all of the controlled substance prescriptions, if any, that your patient has filled in the last twelve months. The information displayed on this report is compiled from pharmacy submissions to the Department, and accurately reflects the information as submitted by the pharmacies.    This report was requested by: Josette Dobbins | Reference #: [id number]    Practitioner Count: 1  Pharmacy Count: 1  Current Opioid Prescriptions: 0  Current Benzodiazepine Prescriptions: 1  Current Stimulant Prescriptions: 0      Patient Demographic Information (PDI)       PDI	First Name	Last Name	Birth Date	Gender	Street Address	[facility]	Zip Code  A	Leticia Finley	1945	Female	[address]	[facility]	[id number]    Prescription Information      PDI Filter:    PDI	My Rx	Current Rx	Drug Type	Rx Written	Rx Dispensed	Drug	Quantity	Days Supply	Prescriber Name	Prescriber HUANG #	Payment Method	Dispenser  A	N	Y	B	04/14/2025	04/23/2025	diazepam 5 mg tablet	30	30	Suden, Elisa	[id number]	Medicare	[facility] [place]   A	N	N	B	03/07/2025	03/13/2025	diazepam 5 mg tablet	30	30	Suden, Elisa	[id number]	Medicare	[facility] [place]   A	N	N	B	01/14/2025	01/16/2025	diazepam 5 mg tablet	30	30	Suden, Elisa	[id number]	Medicare	[facility] [place]   A	N	N	B	11/09/2024	11/09/2024	diazepam 5 mg tablet	30	30	Suden, Elisa	[id number]	Medicare	[facility] [place]   A	N	N	B	08/29/2024	08/30/2024	diazepam 5 mg tablet	30	30	Suden, Elisa	[id number]	Insurance	[facility] [place]   A	N	N	B	06/09/2024	06/10/2024	diazepam 5 mg tablet	30	30	Suden, Elisa	[id number]	Insurance	[facility] [place]     * - Details of Drug Type : O = Opioid, B = Benzodiazepine, S = Stimulant    * - Drugs marked with an asterisk are compound drugs. If the compound drug is made up of more than one controlled substance, then each controlled substance will be a separate row in the table.

## 2025-05-05 NOTE — PROGRESS NOTE ADULT - PROBLEM SELECTOR PLAN 3
RESOLVED  Cr 1.36 on adm. Improved to 1.16 after 1.5L IVF. Baseline 0.7 in 7/2024 (OSH Cards note).  FeNa 1.4% (indeterminant; on diuretic), FeUrea 42% (intrinsic)  Ddx: Likely intrinsic from poor perfusion (given +orthostat, normotensive despite holding antihypertensives, recent procedure on 4/30).  S/p 1.5L IVF (EF 55-60% per Echo 1/2025).  - continue to hold torsemide and diltiazem to prevent hypotension/poor perfusion

## 2025-05-05 NOTE — DISCHARGE NOTE PROVIDER - NSDCFUADDAPPT_GEN_ALL_CORE_FT
Please see your primary care physician in 2 weeks. You also will need to see an endocrinologist (diabetes doctor).  Please see your primary care physician in 2 weeks. You also will need to see an endocrinologist (diabetes doctor).     Also see your cardiologist within 2-3 weeks for afib with fast heart rate.

## 2025-05-05 NOTE — PROGRESS NOTE ADULT - PROBLEM SELECTOR PLAN 2
#Recurrent cardiac thrombus; s/p Watchman device  Currently rate controlled, takes eliquis 5 mg BID and toprol 200 mg qd.  Per OSH cardiologist at [facility], pt has had recurrent h/o cardiac thrombus despite LLA closure w/ Watchman whenever he is transitioned from eliquis to ASA (2/2 thrombocytopenia).   Last echo (1/2025): EF 55-60%. Diastolic fx unable to be determined 2/2 afib.   Currently in afib w/ rvr     Plan:   - C/w home medications  - bladder scan x1, EKG

## 2025-05-05 NOTE — DISCHARGE NOTE PROVIDER - NSDCCPCAREPLAN_GEN_ALL_CORE_FT
PRINCIPAL DISCHARGE DIAGNOSIS  Diagnosis: Weakness  Assessment and Plan of Treatment: Your weakness could've been due to many factors including polypharmacy (being on many medications), orthostatic hypotension (postural low blood pressure). You should stop taking torsemide and diltiazem and follow up with your primary care physician or cardiologist to see if this should be resumed. They were stopped due to you having a normal blood pressure without them. In addition, stop taking gabapentin as they can also cause weakness in the legs. If you continue to have problems, please seek medical care.      SECONDARY DISCHARGE DIAGNOSES  Diagnosis: Type 2 diabetes mellitus  Assessment and Plan of Treatment: You were found to have diabetes with A1c higher than 10%. You should start taking medications: metformin 500mg twice a day and Januvia 100mg once a day. If you are doing well on metformin 500mg twice a day, you can increase it to 1000mg twice a day. Please see an endocrinologist. Since you are following at [facility] with other specialists, it may be in the best interest to see an endocrinologist at [facility] for continuity of care. Please continue to measure your blood sugar levels at home with the glucometer.     PRINCIPAL DISCHARGE DIAGNOSIS  Diagnosis: Weakness  Assessment and Plan of Treatment: Your weakness could've been due to many factors including polypharmacy (being on many medications), orthostatic hypotension (postural low blood pressure). You should stop taking torsemide and follow up with your primary care physician or cardiologist to see if this should be resumed. They were stopped due to you having a normal blood pressure without them. In addition, stop taking gabapentin as they can also cause weakness in the legs. If you continue to have problems, please seek medical care.      SECONDARY DISCHARGE DIAGNOSES  Diagnosis: Chronic atrial fibrillation  Assessment and Plan of Treatment: You had irregular heart rhythm at a rapid rate during the admission. Please continue taking metoprolol and diltiazem to prevent rapid rate.    Diagnosis: Type 2 diabetes mellitus  Assessment and Plan of Treatment: You were found to have diabetes with A1c higher than 10%. You should start taking medications: metformin 500mg twice a day and Januvia 100mg once a day. If you are doing well on metformin 500mg twice a day, you can increase it to 1000mg twice a day. Please see an endocrinologist. Since you are following at [facility] with other specialists, it may be in the best interest to see an endocrinologist at [facility] for continuity of care. Please continue to measure your blood sugar levels at home with the glucometer.

## 2025-05-05 NOTE — DISCHARGE NOTE PROVIDER - HOSPITAL COURSE
#Discharge: do not delete    Ms. 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     Hospital course (by problem; priority based):  Problem/Plan - 1:  ·  Problem: Postural dizziness with presyncope.   Reports episode on 5/1 w/ legs suddenly gave out, causing a near fall (friend's son who lives w/ her caught her). Similar episode on early Monday AM, causing a fall w/ head trauma (neg CT imaging reported by pt at urgent care). Recalls all events, no pre-syncopal sx, good po intake.   Ddx: Postural vs Polypharmacy (toprol, torsemide, diltiazem, valium prn, gabapentin) vs orthostat (many antihypertensives) vs adrenal insufficiency (prolonged steroid use since 4/4 for inflmm changes seen on CT chest per OSH pulm & biopsy of esophageal/larynx mass w/ anesthesia on 4/30)  Orthostat + w/ BP but asymptomatic (5/2); currently s/p 1.5L IVF  - Compression stockings  - Home PT/OT, rolling walker  - Discontinue gabapentin, torsemide, and diltiazem for polypharmacy and as it can contribute to the falls. Can resume valium for now. To be reevaluated by PCP    Problem/Plan - 2:  ·  Problem: RUSTY (acute kidney injury), RESOLVED  Cr 1.36 on adm. Improved to 1.16 after 1.5L IVF. Baseline 0.7 in 7/2024 (OSH Cards note).  FeNa 1.4% (indeterminant; on diuretic), FeUrea 42% (intrinsic)  Ddx: Likely intrinsic from poor perfusion (given +orthostat, normotensive despite holding antihypertensives, recent procedure on 4/30).  S/p 1.5L IVF (EF 55-60% per Echo 1/2025).  Held torsemide and diltiazem to prevent hypotension/poor perfusion      Problem/Plan - 3:  ·  Problem: Uncontrolled type 2 diabetes mellitus with hyperglycemia, NEW ONSET  Pt denies being dx'ed w/ DM II in the past. A1c 10.2% (5/2/2025). Pt currently on prednisone taper for inflamm changes seen on CT (by OSH pulm). Denies any increase in urinary frequency, dehydration. AG wnl.   - Endo consulted; will dc on metformin 500mg BID (if tolerating increase to 1000mg BID after 1 wk), januvia 100mg qd    Problem/Plan - 4:  ·  Problem: Thrombocytopenia.   Plt 61 on adm. Baseline 90-100s (per OSH pulm and card notes).   Chronic problem for pt; followed by hematologist OSH (Dr. Jaspal Reeder MD ([address] [address]) - [phone]). Pt reports not being on any medications for it. 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   - f/u outpt hematologist    Problem/Plan - 5:  ·  Problem: Hyponatremia. (IMPROVED)  Sodium of 130 on adm. 133 on 5/2.   Takes loop diuretic which can contribute and would fit under hypovolemic picture consequently. Also higher than what is documented when correcting for hyperglycemia. S/p 1.5L    Problem/Plan - 6:  ·  Problem: Macrocytosis.   Patient with macrocytic findings but not anemic. B12, folate wnl.    Problem/Plan - 7:  ·  Problem: Chronic atrial fibrillation  #Recurrent cardiac thrombus; s/p Watchman device  Currently rate controlled, takes eliquis 5 mg BID and toprol 200 mg qd.  Per OSH cardiologist at [facility], pt has had recurrent h/o cardiac thrombus despite LLA closure w/ Watchman whenever he is transitioned from eliquis to ASA (2/2 thrombocytopenia).   Last echo (1/2025): EF 55-60%. Diastolic fx unable to be determined 2/2 afib.   - c/w home eliquis    Problem/Plan - 8:  ·  Problem: History of COPD.   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 On Trelegy at home.  Followed by Dr. Thomas at [facility]. Per collateral w/ NP at his office, pt started on prednisone 50mg qd on 4/4 for inflammatory changes seen on CT (3/2025 w/ enlarging LISA solid nodule largest 1.3cm w/ smooth margins and lobulated contours w/ underlying scattered mild chronic foci of tree-in-bud opacity, suggesting foci of mucoid impaction. Also showing new multifocal ground glass foci likely inflammatory changes). Pt was supposed to taper to 40mg x4d starting on 4/14, then down by 10mg every 4d. However, pt reports schedule of needing to transition to 10mg starting on 5/2, which was likely 2/2 previous instruction by pulm office to taper down by 10mg every 7d. Pulm NP also reports pt testing positive for MAC per sputum cx on 4/13/25; plan of tx after steroid was completed.   - Pt to finish prednisone taper, 5mg qd until 5/9/25.       Patient was discharged to: Home w/ home PT/OT    New medications: metformin 500mg BID (if tolerating increase to 1000mg BID after 1 wk), januvia 100mg qd  Changes to old medications: prednisone taper, 5mg qd until 5/9/25  Medications that were stopped: Discontinue gabapentin, torsemide, and diltiazem     Items to follow up as outpatient: Endocrinology, PCP    Physical exam at the time of discharge: AAOx3; NAD; RRR, no murmurs; lungs CLAB; abd NT/ND; extremities wwp, no peripheral edema.     Patient was medically optimized, stable and ready for discharge. Plan of care and return precautions were discussed with the patient who verbally stated understanding. On the day of discharge, the patient was seen and examined. Symptoms improved. Vital signs are stable. Labs and imaging reviewed. Patient is medically optimized and hemodynamically stable. Return precautions discussed, medication teach back done, and importance of physician follow up emphasized. The patient verbalized understanding. #Discharge: do not delete    Ms. 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     Hospital course (by problem; priority based):  Problem/Plan - 1:  ·  Problem: Postural dizziness with presyncope.   Reports episode on 5/1 w/ legs suddenly gave out, causing a near fall (friend's son who lives w/ her caught her). Similar episode on early Monday AM, causing a fall w/ head trauma (neg CT imaging reported by pt at urgent care). Recalls all events, no pre-syncopal sx, good po intake.   Ddx: Postural vs Polypharmacy (toprol, torsemide, diltiazem, valium prn, gabapentin) vs orthostat (many antihypertensives) vs adrenal insufficiency (prolonged steroid use since 4/4 for inflmm changes seen on CT chest per OSH pulm & biopsy of esophageal/larynx mass w/ anesthesia on 4/30)  Orthostat + w/ BP but asymptomatic (5/2); currently s/p 1.5L IVF  - Compression stockings  - Home PT/OT, rolling walker  - Discontinue  torsemide for polypharmacy and as it can contribute to the falls. Can resume valium for now. To be reevaluated by PCP  - May resume gabapentin but only for qhs use    Problem/Plan - 2:  ·  Problem: RUSTY (acute kidney injury), RESOLVED  Cr 1.36 on adm. Improved to 1.16 after 1.5L IVF. Baseline 0.7 in 7/2024 (OSH Cards note).  FeNa 1.4% (indeterminant; on diuretic), FeUrea 42% (intrinsic)  Ddx: Likely intrinsic from poor perfusion (given +orthostat, normotensive despite holding antihypertensives, recent procedure on 4/30).  S/p 1.5L IVF (EF 55-60% per Echo 1/2025).  Held torsemide and diltiazem to prevent hypotension/poor perfusion      Problem/Plan - 3:  ·  Problem: Uncontrolled type 2 diabetes mellitus with hyperglycemia, NEW ONSET  Pt denies being dx'ed w/ DM II in the past. A1c 10.2% (5/2/2025). Pt currently on prednisone taper for inflamm changes seen on CT (by OSH pulm). Denies any increase in urinary frequency, dehydration. AG wnl.   - Endo consulted; will dc on metformin 500mg BID (if tolerating increase to 1000mg BID after 1 wk), januvia 100mg qd    Problem/Plan - 4:  ·  Problem: Thrombocytopenia.   Plt 61 on adm. Baseline 90-100s (per OSH pulm and card notes).   Chronic problem for pt; followed by hematologist OSH (Dr. Jaspal Reeder MD ([address] [address]) - [phone]). Pt reports not being on any medications for it. 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   - f/u outpt hematologist    Problem/Plan - 5:  ·  Problem: Hyponatremia. (IMPROVED)  Sodium of 130 on adm. 133 on 5/2.   Takes loop diuretic which can contribute and would fit under hypovolemic picture consequently. Also higher than what is documented when correcting for hyperglycemia. S/p 1.5L    Problem/Plan - 6:  ·  Problem: Macrocytosis.   Patient with macrocytic findings but not anemic. B12, folate wnl.    Problem/Plan - 7:  ·  Problem: Chronic atrial fibrillation  #Recurrent cardiac thrombus; s/p Watchman device  #Afib w/ RVR  Was rate controlled on eliquis 5 mg BID and toprol 200 mg qd. However, became afib w/ rvr on 5/4-5/5 that pt was not deemed safe to dc.  Per OSH cardiologist at [facility], pt has had recurrent h/o cardiac thrombus despite LLA closure w/ Watchman whenever he is transitioned from eliquis to ASA (2/2 thrombocytopenia).   Last echo (1/2025): EF 55-60%. Diastolic fx unable to be determined 2/2 afib.   - c/w home eliquis  - Restarted home dilt on 5/6.     Problem/Plan - 8:  ·  Problem: History of COPD.   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 On Trelegy at home.  Followed by Dr. Thomas at [facility]. Per collateral w/ NP at his office, pt started on prednisone 50mg qd on 4/4 for inflammatory changes seen on CT (3/2025 w/ enlarging LISA solid nodule largest 1.3cm w/ smooth margins and lobulated contours w/ underlying scattered mild chronic foci of tree-in-bud opacity, suggesting foci of mucoid impaction. Also showing new multifocal ground glass foci likely inflammatory changes). Pt was supposed to taper to 40mg x4d starting on 4/14, then down by 10mg every 4d. However, pt reports schedule of needing to transition to 10mg starting on 5/2, which was likely 2/2 previous instruction by pulm office to taper down by 10mg every 7d. Pulm NP also reports pt testing positive for MAC per sputum cx on 4/13/25; plan of tx after steroid was completed.   - Pt to finish prednisone taper, 5mg qd until 5/9/25.       Patient was discharged to: Home w/ home PT/OT    New medications: metformin 500mg BID (if tolerating increase to 1000mg BID after 1 wk), januvia 100mg qd  Changes to old medications: prednisone taper, 5mg qd until 5/9/25. Gabapentin only qhs.  Medications that were stopped: Discontinue torsemide     Items to follow up as outpatient: Endocrinology, PCP    Physical exam at the time of discharge: AAOx3; NAD; RRR, no murmurs; lungs CLAB; abd NT/ND; extremities wwp, no peripheral edema.     Patient was medically optimized, stable and ready for discharge. Plan of care and return precautions were discussed with the patient who verbally stated understanding. On the day of discharge, the patient was seen and examined. Symptoms improved. Vital signs are stable. Labs and imaging reviewed. Patient is medically optimized and hemodynamically stable. Return precautions discussed, medication teach back done, and importance of physician follow up emphasized. The patient verbalized understanding.

## 2025-05-05 NOTE — PROGRESS NOTE ADULT - PROBLEM SELECTOR PLAN 5
Plt 61 on adm. Baseline 90-100s (per OSH pulm and card notes).   Chronic problem for pt; followed by hematologist OSH (Dr. Jaspal Reeder MD ([address] [address]) - [phone]). Pt reports not being on any medications for it. 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   - ctm  - pending records from hematologist office (called on 5/2)

## 2025-05-05 NOTE — CHART NOTE - NSCHARTNOTEFT_GEN_A_CORE
Endocrinology following for diabetes management.     FS reviewed:  179 mg/dL (05-04 @ 08:51) 8 + 2  196 mg/dL (05-04 @ 13:10) 8 + 2  183 mg/dL (05-04 @ 17:47) 8 + 2  167 mg/dL (05-04 @ 22:07) lantus 24 + 2  194 mg/dL (05-05 @ 08:59) 8 + 2    #Type 2 DM worsened by steroid use.   - c/w prednisone 5mg to complete course. no evidence of adrenal insufficiency thus far.   - for discharge: metformin 500mg BID if tolerating can increase to 1000mg BID after one week. Januvia 100mg qd.   - Send glucometer and supplies to patient's pharmacy  - She prefers to follow endocrine at [facility] to keep her doctors in the same system.   - IF she chooses: she can follow up at discharge with [facility] by calling [phone] to make an appointment.    Malaika Youssef  Endocrinology Fellow Endocrinology following for diabetes management.     FS reviewed:  179 mg/dL (05-04 @ 08:51) 8 + 2  196 mg/dL (05-04 @ 13:10) 8 + 2  183 mg/dL (05-04 @ 17:47) 8 + 2  167 mg/dL (05-04 @ 22:07) lantus 24 + 2  194 mg/dL (05-05 @ 08:59) 8 + 2    #Type 2 DM worsened by steroid use.   - c/w prednisone 5mg to complete course. no evidence of adrenal insufficiency thus far.   - for discharge: metformin 500mg BID if tolerating can increase to 1000mg BID after one week. Januvia 100mg qd.   - Send glucometer and supplies to patient's pharmacy  - She prefers to follow endocrine at [facility] to keep her doctors in the same system.   - IF she chooses: she can follow up at discharge with [facility] by calling [phone] to make an appointment.    Malaika Youssef  Endocrinology Fellow    Attending:  Above discussed with Dr. Youssef prior to the pt's anticipated discharge.  Glucoses are nearly all in target range, albeit near the upper part of this range or slightly above.  She is on moderate insulin doses which are borderline in terms of conversion to an oral agent regimen.  Nonetheless, she is only now down to 5 mg Prednisone/day, and will be able to discontinue it in another 5 days, so further improvement in her glucoses and insulin sensitivity would be expected.  Her cortisol level of 10 mcg/dl suggests that she has minimal adrenal suppression at this point, so the Prednisone can be discontinued after 5 mg without transitioning to hydrocortisone    KALINA Vargas MD

## 2025-05-05 NOTE — DISCHARGE NOTE PROVIDER - NSDCMRMEDTOKEN_GEN_ALL_CORE_FT
Anoro Ellipta 62.5 mcg-25 mcg/inh inhalation powder: 1 puff(s) inhaled once a day  Eliquis 5 mg oral tablet: 1 tab(s) orally 2 times a day  Januvia 100 mg oral tablet: 1 tab(s) orally once a day  Lipitor: 80 milligram(s) orally once a day  metFORMIN 500 mg oral tablet, extended release: 1 tab(s) orally every 12 hours for 7 days. If you are doing well on it without side effects, you can take 2 tablets every 12 hours after a week.  omeprazole 40 mg oral delayed release capsule: 1 cap(s) orally once a day  predniSONE 5 mg oral tablet: 1 tab(s) orally every 24 hours Please take 1 tablet a day until 5/9/25. Then you should stop taking any prednisone.  Toprol- mg oral tablet, extended release: 1 tab(s) orally once a day  Valium 5 mg oral tablet: 1 tab(s) orally once a day (at bedtime) as needed for  insomnia  Zetia 10 mg oral tablet: 1 tab(s) orally once a day   Anoro Ellipta 62.5 mcg-25 mcg/inh inhalation powder: 1 puff(s) inhaled once a day  DilTIAZem (Eqv-Cardizem CD) 120 mg/24 hours oral capsule, extended release: 1 tab(s) orally once a day  Eliquis 5 mg oral tablet: 1 tab(s) orally 2 times a day  Januvia 100 mg oral tablet: 1 tab(s) orally once a day  Lipitor: 80 milligram(s) orally once a day  metFORMIN 500 mg oral tablet, extended release: 1 tab(s) orally every 12 hours for 7 days. If you are doing well on it without side effects, you can take 2 tablets every 12 hours after a week.  Neurontin 300 mg oral capsule: 1 tab(s) orally once a day (at bedtime)  omeprazole 40 mg oral delayed release capsule: 1 cap(s) orally once a day  predniSONE 5 mg oral tablet: 1 tab(s) orally every 24 hours Please take 1 tablet a day until 5/9/25. Then you should stop taking any prednisone.  Toprol- mg oral tablet, extended release: 1 tab(s) orally once a day  Valium 5 mg oral tablet: 1 tab(s) orally once a day (at bedtime) as needed for  insomnia  Zetia 10 mg oral tablet: 1 tab(s) orally once a day

## 2025-05-05 NOTE — PROGRESS NOTE ADULT - PROBLEM SELECTOR PLAN 8
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 On Trelegy at home.  Followed by Dr. Thomas at [facility]. Per collateral w/ NP at his office, pt started on prednisone 50mg qd on 4/4 for inflammatory changes seen on CT (3/2025 w/ enlarging LISA solid nodule largest 1.3cm w/ smooth margins and lobulated contours w/ underlying scattered mild chronic foci of tree-in-bud opacity, suggesting foci of mucoid impaction. Also showing new multifocal ground glass foci likely inflammatory changes). Pt was supposed to taper to 40mg x4d starting on 4/14, then down by 10mg every 4d. However, pt reports schedule of needing to transition to 10mg starting on 5/2, which was likely 2/2 previous instruction by pulm office to taper down by 10mg every 7d. Pulm NP also reports pt testing positive for MAC per sputum cx on 4/13/25; plan of tx after steroid was completed.     Plan:   - C/w home medication  - Will start prednisone 10mg qd x 7d (5/2-5/8) as original OSH pulm rec especially in light of possible adrenal insufficiency from being on long-term steroid; would want to avoid steroid to be tapered off too quickly.      #SHEY  Patient compliant with bipap at home.     Plan:   - C/w bipap at night

## 2025-05-05 NOTE — PROGRESS NOTE ADULT - SUBJECTIVE AND OBJECTIVE BOX
Progress Note    INTERVAL EVENTS:   No acute events overnight.    SUBJECTIVE:   Patient seen and examined at bedside. Condition largely unchanged from prior. Has not had recurrent episode of sudden weakness while hospitalized. Denies CP, palpitations, dizziness.     ROS:  Negative unless otherwise stated above.    PHYSICAL EXAM:  General: Alert and oriented x 3. No acute distress.   HEENT: Moist mucous membranes. Anicteric   Cardiovascular: Afib w/ rvr.    Lungs: Clear to auscultation bilaterally. No accessory muscle use.  Abdomen: Soft, non-tender and non-distended. No palpable masses.  Extremities: No edema. Non-tender. Warm.  Skin: No rashes or lesions.   Neurologic: No apparent focal neurological deficits. CN II-XII grossly intact, but not individually tested.  Psychiatric: Cooperative. Appropriate mood and affect.    VITAL SIGNS:  Vital Signs Last 24 Hrs  T(C): 36.4 (05 May 2025 15:00), Max: 36.5 (04 May 2025 20:51)  T(F): 97.6 (05 May 2025 15:00), Max: 97.7 (04 May 2025 20:51)  HR: 112 (05 May 2025 09:45) (76 - 124)  BP: 113/81 (05 May 2025 05:45) (103/66 - 113/81)  BP(mean): --  RR: 18 (05 May 2025 15:00) (18 - 27)  SpO2: 98% (05 May 2025 15:00) (96% - 99%)    Parameters below as of 05 May 2025 15:00  Patient On (Oxygen Delivery Method): room air      INPATIENT MEDICATIONS:   MEDICATIONS  (STANDING):  apixaban 5 milliGRAM(s) Oral every 12 hours  atorvastatin 80 milliGRAM(s) Oral at bedtime  dextrose 5%. 1000 milliLiter(s) (50 mL/Hr) IV Continuous <Continuous>  dextrose 5%. 1000 milliLiter(s) (100 mL/Hr) IV Continuous <Continuous>  dextrose 50% Injectable 25 Gram(s) IV Push once  dextrose 50% Injectable 12.5 Gram(s) IV Push once  dextrose 50% Injectable 25 Gram(s) IV Push once  ezetimibe 10 milliGRAM(s) Oral daily  fluticasone propionate/ salmeterol 100-50 MICROgram(s) Diskus 1 Dose(s) Inhalation two times a day  glucagon  Injectable 1 milliGRAM(s) IntraMuscular once  insulin glargine Injectable (LANTUS) 24 Unit(s) SubCutaneous at bedtime  insulin lispro (ADMELOG) corrective regimen sliding scale   SubCutaneous Before meals and at bedtime  insulin lispro Injectable (ADMELOG) 8 Unit(s) SubCutaneous three times a day before meals  melatonin 5 milliGRAM(s) Oral at bedtime  metoprolol succinate  milliGRAM(s) Oral daily  pantoprazole    Tablet 40 milliGRAM(s) Oral before breakfast  polyethylene glycol 3350 17 Gram(s) Oral two times a day  predniSONE   Tablet 5 milliGRAM(s) Oral every 24 hours  senna 2 Tablet(s) Oral at bedtime  tiotropium 2.5 MICROgram(s) Inhaler 2 Puff(s) Inhalation daily    MEDICATIONS  (PRN):  acetaminophen     Tablet .. 650 milliGRAM(s) Oral every 6 hours PRN Temp greater or equal to 38C (100.4F), Mild Pain (1 - 3)  albuterol/ipratropium for Nebulization 3 milliLiter(s) Nebulizer every 6 hours PRN Shortness of Breath and/or Wheezing  dextrose Oral Gel 15 Gram(s) Oral once PRN Blood Glucose LESS THAN 70 milliGRAM(s)/deciliter    HOME MEDICATIONS  Anoro Ellipta 62.5 mcg-25 mcg/inh inhalation powder: 1 puff(s) inhaled once a day  Eliquis 5 mg oral tablet: 1 tab(s) orally 2 times a day  Januvia 100 mg oral tablet: 1 tab(s) orally once a day  Lipitor: 80 milligram(s) orally once a day  metFORMIN 500 mg oral tablet, extended release: 1 tab(s) orally every 12 hours for 7 days. If you are doing well on it without side effects, you can take 2 tablets every 12 hours after a week.  omeprazole 40 mg oral delayed release capsule: 1 cap(s) orally once a day  predniSONE 5 mg oral tablet: 1 tab(s) orally every 24 hours Please take 1 tablet a day until 5/9/25. Then you should stop taking any prednisone.  Toprol- mg oral tablet, extended release: 1 tab(s) orally once a day  Valium 5 mg oral tablet: 1 tab(s) orally once a day (at bedtime) as needed for  insomnia  Zetia 10 mg oral tablet: 1 tab(s) orally once a day    ALLERGIES:  Allergies    No Known Allergies    Intolerances    LABS:             CAPILLARY BLOOD GLUCOSE      POCT Blood Glucose.: 243 mg/dL (05 May 2025 12:46)    RADIOLOGY & ADDITIONAL TESTS: Reviewed.

## 2025-05-06 ENCOUNTER — TRANSCRIPTION ENCOUNTER (OUTPATIENT)
Age: 80
End: 2025-05-06

## 2025-05-06 VITALS
RESPIRATION RATE: 17 BRPM | SYSTOLIC BLOOD PRESSURE: 103 MMHG | OXYGEN SATURATION: 97 % | DIASTOLIC BLOOD PRESSURE: 63 MMHG | HEART RATE: 100 BPM | TEMPERATURE: 98 F

## 2025-05-06 PROCEDURE — 94660 CPAP INITIATION&MGMT: CPT

## 2025-05-06 PROCEDURE — 80048 BASIC METABOLIC PNL TOTAL CA: CPT

## 2025-05-06 PROCEDURE — 82610 CYSTATIN C: CPT

## 2025-05-06 PROCEDURE — 85049 AUTOMATED PLATELET COUNT: CPT

## 2025-05-06 PROCEDURE — 99285 EMERGENCY DEPT VISIT HI MDM: CPT

## 2025-05-06 PROCEDURE — 82962 GLUCOSE BLOOD TEST: CPT

## 2025-05-06 PROCEDURE — 84540 ASSAY OF URINE/UREA-N: CPT

## 2025-05-06 PROCEDURE — 84439 ASSAY OF FREE THYROXINE: CPT

## 2025-05-06 PROCEDURE — 84443 ASSAY THYROID STIM HORMONE: CPT

## 2025-05-06 PROCEDURE — 71045 X-RAY EXAM CHEST 1 VIEW: CPT

## 2025-05-06 PROCEDURE — 84100 ASSAY OF PHOSPHORUS: CPT

## 2025-05-06 PROCEDURE — 85025 COMPLETE CBC W/AUTO DIFF WBC: CPT

## 2025-05-06 PROCEDURE — 70450 CT HEAD/BRAIN W/O DYE: CPT | Mod: MC

## 2025-05-06 PROCEDURE — 97165 OT EVAL LOW COMPLEX 30 MIN: CPT

## 2025-05-06 PROCEDURE — 84156 ASSAY OF PROTEIN URINE: CPT

## 2025-05-06 PROCEDURE — 82746 ASSAY OF FOLIC ACID SERUM: CPT

## 2025-05-06 PROCEDURE — 72125 CT NECK SPINE W/O DYE: CPT | Mod: MC

## 2025-05-06 PROCEDURE — 83935 ASSAY OF URINE OSMOLALITY: CPT

## 2025-05-06 PROCEDURE — 94640 AIRWAY INHALATION TREATMENT: CPT

## 2025-05-06 PROCEDURE — 99233 SBSQ HOSP IP/OBS HIGH 50: CPT

## 2025-05-06 PROCEDURE — 83036 HEMOGLOBIN GLYCOSYLATED A1C: CPT

## 2025-05-06 PROCEDURE — 82565 ASSAY OF CREATININE: CPT

## 2025-05-06 PROCEDURE — 81003 URINALYSIS AUTO W/O SCOPE: CPT

## 2025-05-06 PROCEDURE — 82607 VITAMIN B-12: CPT

## 2025-05-06 PROCEDURE — 97161 PT EVAL LOW COMPLEX 20 MIN: CPT

## 2025-05-06 PROCEDURE — 83735 ASSAY OF MAGNESIUM: CPT

## 2025-05-06 PROCEDURE — 36415 COLL VENOUS BLD VENIPUNCTURE: CPT

## 2025-05-06 PROCEDURE — 70486 CT MAXILLOFACIAL W/O DYE: CPT | Mod: MC

## 2025-05-06 PROCEDURE — 93005 ELECTROCARDIOGRAM TRACING: CPT

## 2025-05-06 PROCEDURE — 71045 X-RAY EXAM CHEST 1 VIEW: CPT | Mod: 26

## 2025-05-06 PROCEDURE — 82533 TOTAL CORTISOL: CPT

## 2025-05-06 PROCEDURE — 84133 ASSAY OF URINE POTASSIUM: CPT

## 2025-05-06 PROCEDURE — 82803 BLOOD GASES ANY COMBINATION: CPT

## 2025-05-06 PROCEDURE — 84300 ASSAY OF URINE SODIUM: CPT

## 2025-05-06 PROCEDURE — 82570 ASSAY OF URINE CREATININE: CPT

## 2025-05-06 RX ORDER — GABAPENTIN 400 MG/1
1 CAPSULE ORAL
Qty: 0 | Refills: 0 | DISCHARGE
Start: 2025-05-06

## 2025-05-06 RX ORDER — ACETAMINOPHEN 500 MG/5ML
1000 LIQUID (ML) ORAL ONCE
Refills: 0 | Status: COMPLETED | OUTPATIENT
Start: 2025-05-06 | End: 2025-05-06

## 2025-05-06 RX ORDER — DILTIAZEM HYDROCHLORIDE 120 MG/1
1 CAPSULE, EXTENDED RELEASE ORAL
Qty: 0 | Refills: 0 | DISCHARGE
Start: 2025-05-06

## 2025-05-06 RX ORDER — DILTIAZEM HYDROCHLORIDE 120 MG/1
120 CAPSULE, EXTENDED RELEASE ORAL EVERY 24 HOURS
Refills: 0 | Status: DISCONTINUED | OUTPATIENT
Start: 2025-05-06 | End: 2025-05-06

## 2025-05-06 RX ADMIN — METOPROLOL SUCCINATE 200 MILLIGRAM(S): 50 TABLET, EXTENDED RELEASE ORAL at 06:48

## 2025-05-06 RX ADMIN — DEXTROMETHORPHAN HBR, GUAIFENESIN 200 MILLIGRAM(S): 200 LIQUID ORAL at 00:09

## 2025-05-06 RX ADMIN — DILTIAZEM HYDROCHLORIDE 120 MILLIGRAM(S): 120 CAPSULE, EXTENDED RELEASE ORAL at 09:24

## 2025-05-06 RX ADMIN — EZETIMIBE 10 MILLIGRAM(S): 10 TABLET ORAL at 12:02

## 2025-05-06 RX ADMIN — INSULIN LISPRO 8 UNIT(S): 100 INJECTION, SOLUTION INTRAVENOUS; SUBCUTANEOUS at 09:25

## 2025-05-06 RX ADMIN — Medication 1 DOSE(S): at 06:48

## 2025-05-06 RX ADMIN — APIXABAN 5 MILLIGRAM(S): 2.5 TABLET, FILM COATED ORAL at 06:48

## 2025-05-06 RX ADMIN — INSULIN LISPRO 4: 100 INJECTION, SOLUTION INTRAVENOUS; SUBCUTANEOUS at 13:20

## 2025-05-06 RX ADMIN — Medication 1000 MILLIGRAM(S): at 09:30

## 2025-05-06 RX ADMIN — TIOTROPIUM BROMIDE INHALATION SPRAY 2 PUFF(S): 3.12 SPRAY, METERED RESPIRATORY (INHALATION) at 12:03

## 2025-05-06 RX ADMIN — POLYETHYLENE GLYCOL 3350 17 GRAM(S): 17 POWDER, FOR SOLUTION ORAL at 06:48

## 2025-05-06 RX ADMIN — INSULIN LISPRO 8 UNIT(S): 100 INJECTION, SOLUTION INTRAVENOUS; SUBCUTANEOUS at 13:21

## 2025-05-06 RX ADMIN — PREDNISONE 5 MILLIGRAM(S): 20 TABLET ORAL at 09:25

## 2025-05-06 RX ADMIN — Medication 40 MILLIGRAM(S): at 06:48

## 2025-05-06 RX ADMIN — Medication 1000 MILLIGRAM(S): at 10:00

## 2025-05-06 NOTE — PROGRESS NOTE ADULT - PROBLEM SELECTOR PLAN 9
Patient with history of hypertension. Takes diltiazem 120 mg qd, toprol 200 mg qd, and torsemide 20 mg qd.     Plan;  - Patient currently normotensive, will hold torsemide at this time  - Restart home medications if patient becomes hypertensive    #Hyperlipidemia  Patient takes atorvastatin 80 mg qhs and zetia 10 mg qd.    Plan:   - C/w home medications

## 2025-05-06 NOTE — DISCHARGE NOTE NURSING/CASE MANAGEMENT/SOCIAL WORK - PATIENT PORTAL LINK FT
You can access the FollowMyHealth Patient Portal offered by [facility] by registering at the following website: [email or web address]. By joining [employer]’s FollowMyHealth portal, you will also be able to view your health information using other applications (apps) compatible with our system.

## 2025-05-06 NOTE — PROGRESS NOTE ADULT - PROBLEM/PLAN-1
DISPLAY PLAN FREE TEXT
DISPLAY PLAN FREE TEXT
PROVIDER:[TOKEN:[[id number]],FOLLOWUP:[1 week]]
DISPLAY PLAN FREE TEXT

## 2025-05-06 NOTE — PROGRESS NOTE ADULT - SUBJECTIVE AND OBJECTIVE BOX
Patient is a 80y old  Female who presents with a chief complaint of Weakness (05 May 2025 16:42)    INTERVAL EVENTS:  toelrating diet   no dizziness today   HR better controlled with resumption of diltiazem     SUBJECTIVE:  Patient was seen and examined at bedside.    Review of systems: No CP, dyspnea, nausea or vomiting, dysuria, LE edema.     Diet, Consistent Carbohydrate/No Snacks (05-02-25 @ 07:41) [Active]      MEDICATIONS:  MEDICATIONS  (STANDING):  apixaban 5 milliGRAM(s) Oral every 12 hours  atorvastatin 80 milliGRAM(s) Oral at bedtime  dextrose 5%. 1000 milliLiter(s) (50 mL/Hr) IV Continuous <Continuous>  dextrose 5%. 1000 milliLiter(s) (100 mL/Hr) IV Continuous <Continuous>  dextrose 50% Injectable 25 Gram(s) IV Push once  dextrose 50% Injectable 12.5 Gram(s) IV Push once  dextrose 50% Injectable 25 Gram(s) IV Push once  diltiazem    milliGRAM(s) Oral every 24 hours  ezetimibe 10 milliGRAM(s) Oral daily  fluticasone propionate/ salmeterol 100-50 MICROgram(s) Diskus 1 Dose(s) Inhalation two times a day  glucagon  Injectable 1 milliGRAM(s) IntraMuscular once  insulin glargine Injectable (LANTUS) 24 Unit(s) SubCutaneous at bedtime  insulin lispro (ADMELOG) corrective regimen sliding scale   SubCutaneous Before meals and at bedtime  insulin lispro Injectable (ADMELOG) 8 Unit(s) SubCutaneous three times a day before meals  melatonin 5 milliGRAM(s) Oral at bedtime  metoprolol succinate  milliGRAM(s) Oral daily  pantoprazole    Tablet 40 milliGRAM(s) Oral before breakfast  polyethylene glycol 3350 17 Gram(s) Oral two times a day  predniSONE   Tablet 5 milliGRAM(s) Oral every 24 hours  senna 2 Tablet(s) Oral at bedtime  tiotropium 2.5 MICROgram(s) Inhaler 2 Puff(s) Inhalation daily    MEDICATIONS  (PRN):  acetaminophen     Tablet .. 650 milliGRAM(s) Oral every 6 hours PRN Temp greater or equal to 38C (100.4F), Mild Pain (1 - 3)  albuterol/ipratropium for Nebulization 3 milliLiter(s) Nebulizer every 6 hours PRN Shortness of Breath and/or Wheezing  dextrose Oral Gel 15 Gram(s) Oral once PRN Blood Glucose LESS THAN 70 milliGRAM(s)/deciliter      Allergies    No Known Allergies    Intolerances        OBJECTIVE:  Vital Signs Last 24 Hrs  T(C): 36.6 (06 May 2025 11:21), Max: 36.9 (06 May 2025 06:43)  T(F): 97.9 (06 May 2025 11:21), Max: 98.4 (06 May 2025 06:43)  HR: 100 (06 May 2025 11:21) (80 - 114)  BP: 103/63 (06 May 2025 11:21) (103/63 - 134/81)  BP(mean): 99 (06 May 2025 06:43) (99 - 99)  RR: 17 (06 May 2025 11:21) (17 - 18)  SpO2: 97% (06 May 2025 11:21) (95% - 97%)    Parameters below as of 06 May 2025 11:21  Patient On (Oxygen Delivery Method): room air      I&O's Summary      PHYSICAL EXAM:  Gen: Reclining in bed at time of exam, appears stated age  HEENT: MMM, clear OP ; bruising around right eye and left eye   Neck:  trachea at midline  CV: RRR, +S1/S2  Pulm: adequate respiratory effort, no increase in work of breathing  Abd: soft, ND  Skin: warm and dry,   Ext: no LE edema, compression socks on   Neuro: Alert, oriented,, speaking in full sentences  Psych: affect and behavior appropriate, pleasant at time of interview    LABS:              CAPILLARY BLOOD GLUCOSE      POCT Blood Glucose.: 228 mg/dL (06 May 2025 12:28)  POCT Blood Glucose.: 124 mg/dL (06 May 2025 08:54)        MICRODATA:      RADIOLOGY/OTHER STUDIES:

## 2025-05-06 NOTE — PROGRESS NOTE ADULT - TIME BILLING
Bedside exam and interview   Reviewed vitals, labs   Discussed patient's plan of care with housestaff   Documentation of encounter  Excludes teaching time and/or separately reported services    discharge services provided by Dr. Godwin on 5/5/2025 (see attestation to discharge note)
Evaluation of patient, review of charts, d/w RN
Evaluation of patient, review of charts, d/w RN

## 2025-05-06 NOTE — DISCHARGE NOTE NURSING/CASE MANAGEMENT/SOCIAL WORK - FINANCIAL ASSISTANCE
[facility] provides services at a reduced cost to those who are determined to be eligible through [facility]’s financial assistance program. Information regarding [facility]’s financial assistance program can be found by going to [email or web address] or by calling [phone].

## 2025-05-06 NOTE — PROGRESS NOTE ADULT - PROBLEM SELECTOR PLAN 1
Reports episode on 5/1 w/ legs suddently gave out, causing a near fall (friend's son who lives w/ her caught her). Similar episode on early Monday AM, causing a fall w/ head trauma (neg CT imaging reported by pt at urgent care). Recalls all events, no pre-syncopal sx, good po intake.   Ddx: Polypharmacy (toprol, torsemide, diltiazem, valium prn, gabapentin) vs orthostat (many antihypertensives) vs adrenal insufficiency (prolonged steroid use since 4/4 for inflmm changes seen on CT chest per OSH pulm & biopsy of esophageal/larynx mass w/ anesthesia on 4/30)  Orthostat + w/ BP but asymptomatic (5/2); currently s/p 1.5L IVF  Repeat orthostat neg on 5/5 w/ compression stockings    Plan:   - Compression stockings  - PT/OT rec Home PT w/ rolling walker  - Continue to hold torsemide as pt normotensive w/o them; no LE edema with compression socks on   - Holding valium and gabapentin as well; can resume valium at dc just for prn insomnia use.  - per patient, gabapentin did not improve her chronic pain by much --- advised patient to decrease gabapentin to 1 capsule qHS or stop entirely given complaint of weakness this admission

## 2025-05-06 NOTE — DISCHARGE NOTE NURSING/CASE MANAGEMENT/SOCIAL WORK - NSDCFUADDAPPT_GEN_ALL_CORE_FT
Please see your primary care physician in 2 weeks. You also will need to see an endocrinologist (diabetes doctor).     Also see your cardiologist within 2-3 weeks for afib with fast heart rate.

## 2025-05-06 NOTE — PROGRESS NOTE ADULT - PROBLEM SELECTOR PLAN 5
Plt 61 on adm. Baseline 90-100s (per OSH pulm and card notes).   Chronic problem for pt; followed by hematologist OSH (Dr. Jaspal Reeder MD ([address] [address]) - [phone]). Pt reports not being on any medications for it. 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   - ctm  - pending records from hematologist office (called on 5/2)    [ ] last platelet count this admission >50K  ; advised patient to follow up with hematologist within a week to recheck platelets, advised her to discuss with hematologist if eliquis should be continued if subsequent platelet counts are <50K

## 2025-05-06 NOTE — PROGRESS NOTE ADULT - PROBLEM SELECTOR PLAN 2
#Recurrent cardiac thrombus; s/p Watchman device  Currently rate controlled, takes eliquis 5 mg BID and toprol 200 mg qd.  Per OSH cardiologist at [facility], pt has had recurrent h/o cardiac thrombus despite LLA closure w/ Watchman whenever he is transitioned from eliquis to ASA (2/2 thrombocytopenia).   Last echo (1/2025): EF 55-60%. Diastolic fx unable to be determined 2/2 afib.   Currently in afib w/ rvr     Plan:   - C/w home medications metoprolol and diltiazem   - bladder scan x1, EKG

## 2025-05-06 NOTE — PROGRESS NOTE ADULT - PROBLEM SELECTOR PROBLEM 1
Postural dizziness with presyncope
Weakness
Weakness

## 2025-05-06 NOTE — PROGRESS NOTE ADULT - PROBLEM SELECTOR PLAN 8
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 On Trelegy at home.  Followed by Dr. Thomas at [facility]. Per collateral w/ NP at his office, pt started on prednisone 50mg qd on 4/4 for inflammatory changes seen on CT (3/2025 w/ enlarging LISA solid nodule largest 1.3cm w/ smooth margins and lobulated contours w/ underlying scattered mild chronic foci of tree-in-bud opacity, suggesting foci of mucoid impaction. Also showing new multifocal ground glass foci likely inflammatory changes). Pt was supposed to taper to 40mg x4d starting on 4/14, then down by 10mg every 4d. However, pt reports schedule of needing to transition to 10mg starting on 5/2, which was likely 2/2 previous instruction by pulm office to taper down by 10mg every 7d. Pulm NP also reports pt testing positive for MAC per sputum cx on 4/13/25; plan of tx after steroid was completed.     Plan:   - C/w home medication  - Will start prednisone 10mg qd x 7d (5/2-5/8) as original OSH pulm rec especially in light of possible adrenal insufficiency from being on long-term steroid; would want to avoid steroid to be tapered off too quickly.      #SHEY  Patient compliant with bipap at home.     Plan:   - C/w bipap at night

## 2025-05-08 PROBLEM — I48.91 UNSPECIFIED ATRIAL FIBRILLATION: Chronic | Status: ACTIVE | Noted: 2025-05-01

## 2025-05-09 DIAGNOSIS — I10 ESSENTIAL (PRIMARY) HYPERTENSION: ICD-10-CM

## 2025-05-09 DIAGNOSIS — T38.0X5A ADVERSE EFFECT OF GLUCOCORTICOIDS AND SYNTHETIC ANALOGUES, INITIAL ENCOUNTER: ICD-10-CM

## 2025-05-09 DIAGNOSIS — I95.1 ORTHOSTATIC HYPOTENSION: ICD-10-CM

## 2025-05-09 DIAGNOSIS — G47.00 INSOMNIA, UNSPECIFIED: ICD-10-CM

## 2025-05-09 DIAGNOSIS — Z79.84 LONG TERM (CURRENT) USE OF ORAL HYPOGLYCEMIC DRUGS: ICD-10-CM

## 2025-05-09 DIAGNOSIS — A31.0 PULMONARY MYCOBACTERIAL INFECTION: ICD-10-CM

## 2025-05-09 DIAGNOSIS — Z79.899 OTHER LONG TERM (CURRENT) DRUG THERAPY: ICD-10-CM

## 2025-05-09 DIAGNOSIS — I48.20 CHRONIC ATRIAL FIBRILLATION, UNSPECIFIED: ICD-10-CM

## 2025-05-09 DIAGNOSIS — E78.00 PURE HYPERCHOLESTEROLEMIA, UNSPECIFIED: ICD-10-CM

## 2025-05-09 DIAGNOSIS — D75.89 OTHER SPECIFIED DISEASES OF BLOOD AND BLOOD-FORMING ORGANS: ICD-10-CM

## 2025-05-09 DIAGNOSIS — M19.90 UNSPECIFIED OSTEOARTHRITIS, UNSPECIFIED SITE: ICD-10-CM

## 2025-05-09 DIAGNOSIS — K21.9 GASTRO-ESOPHAGEAL REFLUX DISEASE WITHOUT ESOPHAGITIS: ICD-10-CM

## 2025-05-09 DIAGNOSIS — J38.7 OTHER DISEASES OF LARYNX: ICD-10-CM

## 2025-05-09 DIAGNOSIS — E87.1 HYPO-OSMOLALITY AND HYPONATREMIA: ICD-10-CM

## 2025-05-09 DIAGNOSIS — Z91.81 HISTORY OF FALLING: ICD-10-CM

## 2025-05-09 DIAGNOSIS — D69.6 THROMBOCYTOPENIA, UNSPECIFIED: ICD-10-CM

## 2025-05-09 DIAGNOSIS — R53.1 WEAKNESS: ICD-10-CM

## 2025-05-09 DIAGNOSIS — G47.33 OBSTRUCTIVE SLEEP APNEA (ADULT) (PEDIATRIC): ICD-10-CM

## 2025-05-09 DIAGNOSIS — R42 DIZZINESS AND GIDDINESS: ICD-10-CM

## 2025-05-09 DIAGNOSIS — N17.9 ACUTE KIDNEY FAILURE, UNSPECIFIED: ICD-10-CM

## 2025-05-09 DIAGNOSIS — J44.9 CHRONIC OBSTRUCTIVE PULMONARY DISEASE, UNSPECIFIED: ICD-10-CM

## 2025-05-09 DIAGNOSIS — E11.65 TYPE 2 DIABETES MELLITUS WITH HYPERGLYCEMIA: ICD-10-CM

## 2025-05-09 DIAGNOSIS — Z87.891 PERSONAL HISTORY OF NICOTINE DEPENDENCE: ICD-10-CM

## 2025-05-09 DIAGNOSIS — Z79.01 LONG TERM (CURRENT) USE OF ANTICOAGULANTS: ICD-10-CM

## 2025-05-09 DIAGNOSIS — R55 SYNCOPE AND COLLAPSE: ICD-10-CM

## 2025-05-18 PROBLEM — Z00.00 ENCOUNTER FOR PREVENTIVE HEALTH EXAMINATION: Status: ACTIVE | Noted: 2025-05-18

## 2025-08-13 ENCOUNTER — APPOINTMENT (OUTPATIENT)
Dept: ENDOCRINOLOGY | Facility: CLINIC | Age: 80
End: 2025-08-13